# Patient Record
Sex: MALE | Race: WHITE | NOT HISPANIC OR LATINO | Employment: UNEMPLOYED | URBAN - METROPOLITAN AREA
[De-identification: names, ages, dates, MRNs, and addresses within clinical notes are randomized per-mention and may not be internally consistent; named-entity substitution may affect disease eponyms.]

---

## 2018-06-20 ENCOUNTER — OFFICE VISIT CONVERTED (OUTPATIENT)
Dept: FAMILY MEDICINE CLINIC | Facility: CLINIC | Age: 38
End: 2018-06-20
Attending: NURSE PRACTITIONER

## 2018-06-20 ENCOUNTER — CONVERSION ENCOUNTER (OUTPATIENT)
Dept: FAMILY MEDICINE CLINIC | Facility: CLINIC | Age: 38
End: 2018-06-20

## 2019-12-18 ENCOUNTER — HOSPITAL ENCOUNTER (OUTPATIENT)
Dept: FAMILY MEDICINE CLINIC | Facility: CLINIC | Age: 39
Discharge: HOME | End: 2019-12-18
Attending: NURSE PRACTITIONER

## 2019-12-18 ENCOUNTER — CONVERSION ENCOUNTER (OUTPATIENT)
Dept: FAMILY MEDICINE CLINIC | Facility: CLINIC | Age: 39
End: 2019-12-18

## 2019-12-18 ENCOUNTER — OFFICE VISIT CONVERTED (OUTPATIENT)
Dept: FAMILY MEDICINE CLINIC | Facility: CLINIC | Age: 39
End: 2019-12-18
Attending: NURSE PRACTITIONER

## 2019-12-18 LAB
25(OH)D3 SERPL-MCNC: 35.1 NG/ML (ref 30–100)
ALBUMIN SERPL-MCNC: 4.3 G/DL (ref 3.5–5)
ALBUMIN/GLOB SERPL: 1.7 {RATIO} (ref 1.4–2.6)
ALP SERPL-CCNC: 87 U/L (ref 53–128)
ALT SERPL-CCNC: 73 U/L (ref 10–40)
ANION GAP SERPL CALC-SCNC: 14 MMOL/L (ref 8–19)
AST SERPL-CCNC: 48 U/L (ref 15–50)
BASOPHILS # BLD AUTO: 0.06 10*3/UL (ref 0–0.2)
BASOPHILS NFR BLD AUTO: 1.1 % (ref 0–3)
BILIRUB SERPL-MCNC: 0.56 MG/DL (ref 0.2–1.3)
BUN SERPL-MCNC: 13 MG/DL (ref 5–25)
BUN/CREAT SERPL: 12 {RATIO} (ref 6–20)
CALCIUM SERPL-MCNC: 9.1 MG/DL (ref 8.7–10.4)
CHLORIDE SERPL-SCNC: 101 MMOL/L (ref 99–111)
CHOLEST SERPL-MCNC: 179 MG/DL (ref 107–200)
CHOLEST/HDLC SERPL: 5.3 {RATIO} (ref 3–6)
CONV ABS IMM GRAN: 0.01 10*3/UL (ref 0–0.2)
CONV CO2: 26 MMOL/L (ref 22–32)
CONV IMMATURE GRAN: 0.2 % (ref 0–1.8)
CONV TOTAL PROTEIN: 6.8 G/DL (ref 6.3–8.2)
CREAT UR-MCNC: 1.13 MG/DL (ref 0.7–1.2)
DEPRECATED RDW RBC AUTO: 42.8 FL (ref 35.1–43.9)
EOSINOPHIL # BLD AUTO: 0.15 10*3/UL (ref 0–0.7)
EOSINOPHIL # BLD AUTO: 2.8 % (ref 0–7)
ERYTHROCYTE [DISTWIDTH] IN BLOOD BY AUTOMATED COUNT: 12.4 % (ref 11.6–14.4)
GFR SERPLBLD BASED ON 1.73 SQ M-ARVRAT: >60 ML/MIN/{1.73_M2}
GLOBULIN UR ELPH-MCNC: 2.5 G/DL (ref 2–3.5)
GLUCOSE SERPL-MCNC: 117 MG/DL (ref 70–99)
HCT VFR BLD AUTO: 48.5 % (ref 42–52)
HDLC SERPL-MCNC: 34 MG/DL (ref 40–60)
HGB BLD-MCNC: 15.3 G/DL (ref 14–18)
LDLC SERPL CALC-MCNC: 116 MG/DL (ref 70–100)
LYMPHOCYTES # BLD AUTO: 1.95 10*3/UL (ref 1–5)
LYMPHOCYTES NFR BLD AUTO: 36.1 % (ref 20–45)
MCH RBC QN AUTO: 29.6 PG (ref 27–31)
MCHC RBC AUTO-ENTMCNC: 31.5 G/DL (ref 33–37)
MCV RBC AUTO: 93.8 FL (ref 80–96)
MONOCYTES # BLD AUTO: 0.36 10*3/UL (ref 0.2–1.2)
MONOCYTES NFR BLD AUTO: 6.7 % (ref 3–10)
NEUTROPHILS # BLD AUTO: 2.87 10*3/UL (ref 2–8)
NEUTROPHILS NFR BLD AUTO: 53.1 % (ref 30–85)
NRBC CBCN: 0 % (ref 0–0.7)
OSMOLALITY SERPL CALC.SUM OF ELEC: 283 MOSM/KG (ref 273–304)
PLATELET # BLD AUTO: 211 10*3/UL (ref 130–400)
PMV BLD AUTO: 10.9 FL (ref 9.4–12.4)
POTASSIUM SERPL-SCNC: 4.7 MMOL/L (ref 3.5–5.3)
RBC # BLD AUTO: 5.17 10*6/UL (ref 4.7–6.1)
SODIUM SERPL-SCNC: 136 MMOL/L (ref 135–147)
TESTOST SERPL-MCNC: 296 NG/DL (ref 249–836)
TRIGL SERPL-MCNC: 143 MG/DL (ref 40–150)
TSH SERPL-ACNC: 2.19 M[IU]/L (ref 0.27–4.2)
VIT B12 SERPL-MCNC: 495 PG/ML (ref 211–911)
VLDLC SERPL-MCNC: 29 MG/DL (ref 5–37)
WBC # BLD AUTO: 5.4 10*3/UL (ref 4.8–10.8)

## 2020-11-30 ENCOUNTER — HOSPITAL ENCOUNTER (OUTPATIENT)
Dept: FAMILY MEDICINE CLINIC | Facility: CLINIC | Age: 40
Discharge: HOME | End: 2020-11-30
Attending: NURSE PRACTITIONER

## 2020-11-30 ENCOUNTER — CONVERSION ENCOUNTER (OUTPATIENT)
Dept: FAMILY MEDICINE CLINIC | Facility: CLINIC | Age: 40
End: 2020-11-30

## 2020-11-30 ENCOUNTER — OFFICE VISIT CONVERTED (OUTPATIENT)
Dept: FAMILY MEDICINE CLINIC | Facility: CLINIC | Age: 40
End: 2020-11-30
Attending: NURSE PRACTITIONER

## 2020-11-30 LAB
ALBUMIN SERPL-MCNC: 4.5 G/DL (ref 3.5–5)
ALBUMIN/GLOB SERPL: 1.6 {RATIO} (ref 1.4–2.6)
ALP SERPL-CCNC: 105 U/L (ref 53–128)
ALT SERPL-CCNC: 112 U/L (ref 10–40)
ANION GAP SERPL CALC-SCNC: 19 MMOL/L (ref 8–19)
AST SERPL-CCNC: 77 U/L (ref 15–50)
BASOPHILS # BLD AUTO: 0.07 10*3/UL (ref 0–0.2)
BASOPHILS NFR BLD AUTO: 0.9 % (ref 0–3)
BILIRUB SERPL-MCNC: 0.42 MG/DL (ref 0.2–1.3)
BUN SERPL-MCNC: 10 MG/DL (ref 5–25)
BUN/CREAT SERPL: 8 {RATIO} (ref 6–20)
CALCIUM SERPL-MCNC: 9.4 MG/DL (ref 8.7–10.4)
CHLORIDE SERPL-SCNC: 101 MMOL/L (ref 99–111)
CHOLEST SERPL-MCNC: 216 MG/DL (ref 107–200)
CHOLEST/HDLC SERPL: 4.8 {RATIO} (ref 3–6)
CONV ABS IMM GRAN: 0.03 10*3/UL (ref 0–0.2)
CONV CO2: 24 MMOL/L (ref 22–32)
CONV IMMATURE GRAN: 0.4 % (ref 0–1.8)
CONV TOTAL PROTEIN: 7.4 G/DL (ref 6.3–8.2)
CREAT UR-MCNC: 1.21 MG/DL (ref 0.7–1.2)
DEPRECATED RDW RBC AUTO: 42.6 FL (ref 35.1–43.9)
EOSINOPHIL # BLD AUTO: 0.17 10*3/UL (ref 0–0.7)
EOSINOPHIL # BLD AUTO: 2.1 % (ref 0–7)
ERYTHROCYTE [DISTWIDTH] IN BLOOD BY AUTOMATED COUNT: 12.8 % (ref 11.6–14.4)
GFR SERPLBLD BASED ON 1.73 SQ M-ARVRAT: >60 ML/MIN/{1.73_M2}
GLOBULIN UR ELPH-MCNC: 2.9 G/DL (ref 2–3.5)
GLUCOSE SERPL-MCNC: 97 MG/DL (ref 70–99)
HCT VFR BLD AUTO: 50.6 % (ref 42–52)
HDLC SERPL-MCNC: 45 MG/DL (ref 40–60)
HGB BLD-MCNC: 16.7 G/DL (ref 14–18)
LDLC SERPL CALC-MCNC: 141 MG/DL (ref 70–100)
LYMPHOCYTES # BLD AUTO: 2.44 10*3/UL (ref 1–5)
LYMPHOCYTES NFR BLD AUTO: 29.9 % (ref 20–45)
MCH RBC QN AUTO: 29.9 PG (ref 27–31)
MCHC RBC AUTO-ENTMCNC: 33 G/DL (ref 33–37)
MCV RBC AUTO: 90.7 FL (ref 80–96)
MONOCYTES # BLD AUTO: 0.45 10*3/UL (ref 0.2–1.2)
MONOCYTES NFR BLD AUTO: 5.5 % (ref 3–10)
NEUTROPHILS # BLD AUTO: 5 10*3/UL (ref 2–8)
NEUTROPHILS NFR BLD AUTO: 61.2 % (ref 30–85)
NRBC CBCN: 0 % (ref 0–0.7)
OSMOLALITY SERPL CALC.SUM OF ELEC: 287 MOSM/KG (ref 273–304)
PLATELET # BLD AUTO: 223 10*3/UL (ref 130–400)
PMV BLD AUTO: 11 FL (ref 9.4–12.4)
POTASSIUM SERPL-SCNC: 5 MMOL/L (ref 3.5–5.3)
PSA SERPL-MCNC: 0.62 NG/ML (ref 0–4)
RBC # BLD AUTO: 5.58 10*6/UL (ref 4.7–6.1)
SODIUM SERPL-SCNC: 139 MMOL/L (ref 135–147)
TRIGL SERPL-MCNC: 150 MG/DL (ref 40–150)
TSH SERPL-ACNC: 3.12 M[IU]/L (ref 0.27–4.2)
VLDLC SERPL-MCNC: 30 MG/DL (ref 5–37)
WBC # BLD AUTO: 8.16 10*3/UL (ref 4.8–10.8)

## 2020-12-01 LAB — 25(OH)D3 SERPL-MCNC: 39.7 NG/ML (ref 30–100)

## 2021-05-13 NOTE — PROGRESS NOTES
Progress Note      Patient Name: Sean Gore   Patient ID: [id number]   Sex: Male   YOB: 1980    Primary Care Provider: Rosanna MALIK    Visit Date: November 30, 2020    Provider: HELENA Giles   Location: [facility]   Location Address: [address] [place] [address]  [id number]   Location Phone: [phone]          Chief Complaint  · 6 Month Follow up for Hypothyroidism, Hypogonadism, and Vitamin D Deficiency      History Of Present Illness  Sean Gore is a 40 year old /White male who presents for evaluation and treatment of:      6 Month Follow up for Hypothyroidism, Hypogonadism, and Vitamin D Deficiency  Last lab work done 12/2019  Med refills needed    Sleep apnea - pt reports using cpap nightly. Pt is getting approx. 6-8 hours of sleep per night.     Hypothyroidism - pt reports compliance with med. denies any symptoms.         flu shot- declined         Past Medical History  Disease Name Date Onset Notes   Fatty liver --  --    Hypogonadism --  --    Hypothyroidism --  --    Sleep apnea 06/20/2018 --    Vitamin D Deficiency --  --          Past Surgical History  Procedure Name Date Notes   Cyst Removal --  --    Plantar Fasciitis --  --          Medication List  Name Date Started Instructions   Allegra Allergy 180 mg oral tablet 11/30/2020 take 1 tablet (180 mg) by oral route once daily for 30 days   aspirin 81 mg oral tablet,chewable  chew 1 tablet (81 mg) by oral route once daily   cpap and supplies 12/18/2019 use nightly dx G47.3   levothyroxine 50 mcg oral tablet 12/01/2020 take 1 tablet (50 mcg) by oral route once daily on an empty stomach 30 minutes before breakfast for 90 days   montelukast 10 mg oral tablet 11/30/2020 TAKE 1 TABLET BY MOUTH ONCE DAILY IN THE EVENING         Allergy List  Allergen Name Date Reaction Notes   NO KNOWN DRUG ALLERGIES --  --  --          Family Medical History  Disease Name Relative/Age  "Notes   Breast Neoplasm, Malignant Grandmother (paternal)/   --    Diabetes Grandmother (maternal)/   --          Social History  Finding Status Start/Stop Quantity Notes   Alcohol Never --/-- --  --    Tobacco Never --/-- --  --          Review of Systems  · Constitutional  o Admits  o : weight loss  o Denies  o : fatigue, fever, weight gain, chills  · Cardiovascular  o Denies  o : chest Pain, palpitations, edema (swelling)  · Respiratory  o Denies  o : frequent cough, shortness of breath  · Gastrointestinal  o Denies  o : nausea, vomiting, changes in bowel habits  · Genitourinary  o Denies  o : dysuria, urinary frequency, urinary urgency, polyuria  · Neurologic  o Denies  o : headache, tingling or numbness, dizziness  · Musculoskeletal  o Denies  o : joint pain, myalgias  · Psychiatric  o Denies  o : mood changes, memory changes, SI/HI  · Allergic-Immunologic  o Admits  o : seasonal allergies  o Denies  o : eczema, urticaria      Vitals  Date Time BP Position Site L\R Cuff Size HR RR TEMP (F) WT  HT  BMI kg/m2 BSA m2 O2 Sat FR L/min FiO2 HC       06/20/2018 10:03 /86 Sitting    56 - R 20 97.7 341lbs 0oz 6'  3\" 42.62 2.86 99 %      12/18/2019 10:05 /80 Sitting    89 - R 18  352lbs 16oz 6'  3\" 44.12 2.91 99 %      11/30/2020 04:06 /90 Sitting    66 - R 24 98.8  6'  3\"   99 %            Physical Examination  · Constitutional  o Appearance  o : well-nourished, in no acute distress  · Eyes  o Conjunctivae  o : conjunctivae normal  o Sclerae  o : sclerae white  o Pupils and Irises  o : pupils equal and round  o Eyelids/Ocular Adnexae  o : eyelid appearance normal, no exudates present  · Neck  o Inspection/Palpation  o : normal appearance, no masses or tenderness, trachea midline  o Range of Motion  o : cervical range of motion within normal limits  o Thyroid  o : gland size normal, nontender, no nodules or masses present on palpation  · Respiratory  o Respiratory Effort  o : breathing " unlabored  o Inspection of Chest  o : normal appearance  o Auscultation of Lungs  o : normal breath sounds throughout inspiration and expiration  · Cardiovascular  o Heart  o :   § Auscultation of Heart  § : regular rate and rhythm, no murmurs, gallops or rubs  o Peripheral Vascular System  o :   § Carotid Arteries  § : normal pulses bilaterally, no bruits present  § Extremities  § : no clubbing or edema  · Gastrointestinal  o Abdominal Examination  o : abdomen nontender to palpation, tone normal without rigidity or guarding, no masses present, bowel sounds present  · Skin and Subcutaneous Tissue  o General Inspection  o : no rashes or lesions present, no areas of discoloration  o Body Hair  o : hair normal for age, general body hair distribution normal for age  o Digits and Nails  o : no clubbing, cyanosis, deformities or edema present, normal appearing nails  · Neurologic  o Mental Status Examination  o :   § Orientation  § : grossly oriented to person, place and time  o Gait and Station  o : normal gait, able to stand without difficulty  · Psychiatric  o Judgement and Insight  o : judgment and insight intact  o Mood and Affect  o : mood normal, affect appropriate          Assessment  · Need for influenza vaccination     V04.81/Z23  · Hypogonadism, male     257.2/E29.1  · Hypothyroidism     244.9/E03.9  · Vitamin D deficiency     268.9/E55.9      Plan  · Orders  o Vitamin D Level (00980) - 268.9/E55.9 - 11/30/2020  o Male Physical Primary Care Panel (CMP, CBC, TSH, Lipid, PSA) [facility] (64300, 03799, 10530, 45735, 23627, ) - 257.2/E29.1, 244.9/E03.9, 268.9/E55.9 - 11/30/2020  o ACO-39: Current medications updated and reviewed (, 1159F) - - 11/30/2020  o ACO-14: Influenza immunization was not administered for reasons documented [facility] () - - 11/30/2020  · Medications  o montelukast 10 mg oral tablet   SIG: TAKE 1 TABLET BY MOUTH ONCE DAILY IN THE EVENING   DISP: (90) Tablet with 1 refills  Adjusted on  11/30/2020     o Allegra Allergy 180 mg oral tablet   SIG: take 1 tablet (180 mg) by oral route once daily for 30 days   DISP: (30) Tablet with 5 refills  Refilled on 11/30/2020     o Medications have been Reconciled  o Transition of Care or Provider Policy  · Instructions  o Flu vaccine declined.  o Patient was educated/instructed on their diagnosis, treatment and medications prior to discharge from the clinic today.  o Call the office with any concerns or questions.  · Disposition  o Return to Office in 1 year.            Electronically Signed by: HELENA Giles -Author on December 1, 2020 09:45:31 PM

## 2021-05-14 VITALS
TEMPERATURE: 98.8 F | HEIGHT: 75 IN | DIASTOLIC BLOOD PRESSURE: 90 MMHG | RESPIRATION RATE: 24 BRPM | SYSTOLIC BLOOD PRESSURE: 136 MMHG | OXYGEN SATURATION: 99 % | HEART RATE: 66 BPM

## 2021-05-15 VITALS
SYSTOLIC BLOOD PRESSURE: 131 MMHG | HEART RATE: 89 BPM | BODY MASS INDEX: 39.17 KG/M2 | DIASTOLIC BLOOD PRESSURE: 80 MMHG | WEIGHT: 315 LBS | HEIGHT: 75 IN | RESPIRATION RATE: 18 BRPM | OXYGEN SATURATION: 99 %

## 2021-05-16 VITALS
SYSTOLIC BLOOD PRESSURE: 134 MMHG | HEIGHT: 75 IN | OXYGEN SATURATION: 99 % | BODY MASS INDEX: 39.17 KG/M2 | RESPIRATION RATE: 20 BRPM | TEMPERATURE: 97.7 F | DIASTOLIC BLOOD PRESSURE: 86 MMHG | WEIGHT: 315 LBS | HEART RATE: 56 BPM

## 2021-11-29 RX ORDER — LEVOTHYROXINE SODIUM 0.05 MG/1
TABLET ORAL
Qty: 90 TABLET | Refills: 0 | Status: SHIPPED | OUTPATIENT
Start: 2021-11-29 | End: 2022-03-04 | Stop reason: SDUPTHER

## 2022-02-28 RX ORDER — MONTELUKAST SODIUM 10 MG/1
TABLET ORAL
Qty: 90 TABLET | Refills: 0 | OUTPATIENT
Start: 2022-02-28

## 2022-02-28 RX ORDER — LEVOTHYROXINE SODIUM 0.05 MG/1
TABLET ORAL
Qty: 90 TABLET | Refills: 0 | OUTPATIENT
Start: 2022-02-28

## 2022-03-03 RX ORDER — LEVOTHYROXINE SODIUM 0.05 MG/1
TABLET ORAL
Qty: 90 TABLET | Refills: 0 | OUTPATIENT
Start: 2022-03-03

## 2022-03-03 RX ORDER — MONTELUKAST SODIUM 10 MG/1
TABLET ORAL
Qty: 90 TABLET | Refills: 0 | OUTPATIENT
Start: 2022-03-03

## 2022-03-04 RX ORDER — LEVOTHYROXINE SODIUM 0.05 MG/1
50 TABLET ORAL
Qty: 30 TABLET | Refills: 0 | Status: SHIPPED | OUTPATIENT
Start: 2022-03-04 | End: 2022-03-09 | Stop reason: SDUPTHER

## 2022-03-04 RX ORDER — LEVOTHYROXINE SODIUM 0.05 MG/1
TABLET ORAL
Qty: 90 TABLET | Refills: 0 | OUTPATIENT
Start: 2022-03-04

## 2022-03-09 ENCOUNTER — OFFICE VISIT (OUTPATIENT)
Dept: FAMILY MEDICINE CLINIC | Facility: CLINIC | Age: 42
End: 2022-03-09

## 2022-03-09 VITALS
BODY MASS INDEX: 39.17 KG/M2 | DIASTOLIC BLOOD PRESSURE: 86 MMHG | SYSTOLIC BLOOD PRESSURE: 138 MMHG | TEMPERATURE: 98.8 F | HEART RATE: 85 BPM | OXYGEN SATURATION: 97 % | HEIGHT: 75 IN | WEIGHT: 315 LBS

## 2022-03-09 DIAGNOSIS — E55.9 VITAMIN D DEFICIENCY: ICD-10-CM

## 2022-03-09 DIAGNOSIS — Z13.6 ENCOUNTER FOR SCREENING FOR CARDIOVASCULAR DISORDERS: ICD-10-CM

## 2022-03-09 DIAGNOSIS — E03.9 HYPOTHYROIDISM, UNSPECIFIED TYPE: Primary | ICD-10-CM

## 2022-03-09 DIAGNOSIS — R73.01 IMPAIRED FASTING GLUCOSE: ICD-10-CM

## 2022-03-09 DIAGNOSIS — K76.0 FATTY LIVER: ICD-10-CM

## 2022-03-09 PROBLEM — G47.30 SLEEP APNEA: Status: ACTIVE | Noted: 2018-06-20

## 2022-03-09 LAB
25(OH)D3 SERPL-MCNC: 37.2 NG/ML
ALBUMIN SERPL-MCNC: 4.4 G/DL (ref 3.5–5.2)
ALBUMIN/GLOB SERPL: 1.6 G/DL
ALP SERPL-CCNC: 121 U/L (ref 39–117)
ALT SERPL W P-5'-P-CCNC: 109 U/L (ref 1–41)
ANION GAP SERPL CALCULATED.3IONS-SCNC: 13.2 MMOL/L (ref 5–15)
AST SERPL-CCNC: 92 U/L (ref 1–40)
BILIRUB SERPL-MCNC: 0.6 MG/DL (ref 0–1.2)
BUN SERPL-MCNC: 9 MG/DL (ref 6–20)
BUN/CREAT SERPL: 7.9 (ref 7–25)
CALCIUM SPEC-SCNC: 9.1 MG/DL (ref 8.6–10.5)
CHLORIDE SERPL-SCNC: 99 MMOL/L (ref 98–107)
CHOLEST SERPL-MCNC: 186 MG/DL (ref 0–200)
CO2 SERPL-SCNC: 24.8 MMOL/L (ref 22–29)
CREAT SERPL-MCNC: 1.14 MG/DL (ref 0.76–1.27)
DEPRECATED RDW RBC AUTO: 40.9 FL (ref 37–54)
EGFRCR SERPLBLD CKD-EPI 2021: 82.3 ML/MIN/1.73
ERYTHROCYTE [DISTWIDTH] IN BLOOD BY AUTOMATED COUNT: 12.6 % (ref 12.3–15.4)
GLOBULIN UR ELPH-MCNC: 2.7 GM/DL
GLUCOSE SERPL-MCNC: 332 MG/DL (ref 65–99)
HCT VFR BLD AUTO: 47 % (ref 37.5–51)
HDLC SERPL-MCNC: 31 MG/DL (ref 40–60)
HGB BLD-MCNC: 15.5 G/DL (ref 13–17.7)
LDLC SERPL CALC-MCNC: 110 MG/DL (ref 0–100)
LDLC/HDLC SERPL: 3.33 {RATIO}
MCH RBC QN AUTO: 29.7 PG (ref 26.6–33)
MCHC RBC AUTO-ENTMCNC: 33 G/DL (ref 31.5–35.7)
MCV RBC AUTO: 90 FL (ref 79–97)
PLATELET # BLD AUTO: 191 10*3/MM3 (ref 140–450)
PMV BLD AUTO: 11.3 FL (ref 6–12)
POTASSIUM SERPL-SCNC: 4 MMOL/L (ref 3.5–5.2)
PROT SERPL-MCNC: 7.1 G/DL (ref 6–8.5)
RBC # BLD AUTO: 5.22 10*6/MM3 (ref 4.14–5.8)
SODIUM SERPL-SCNC: 137 MMOL/L (ref 136–145)
TRIGL SERPL-MCNC: 259 MG/DL (ref 0–150)
TSH SERPL DL<=0.05 MIU/L-ACNC: 1.89 UIU/ML (ref 0.27–4.2)
VLDLC SERPL-MCNC: 45 MG/DL (ref 5–40)
WBC NRBC COR # BLD: 6.04 10*3/MM3 (ref 3.4–10.8)

## 2022-03-09 PROCEDURE — 80061 LIPID PANEL: CPT | Performed by: NURSE PRACTITIONER

## 2022-03-09 PROCEDURE — 99213 OFFICE O/P EST LOW 20 MIN: CPT | Performed by: NURSE PRACTITIONER

## 2022-03-09 PROCEDURE — 80050 GENERAL HEALTH PANEL: CPT | Performed by: NURSE PRACTITIONER

## 2022-03-09 PROCEDURE — 83036 HEMOGLOBIN GLYCOSYLATED A1C: CPT | Performed by: NURSE PRACTITIONER

## 2022-03-09 PROCEDURE — 82306 VITAMIN D 25 HYDROXY: CPT | Performed by: NURSE PRACTITIONER

## 2022-03-09 RX ORDER — LEVOTHYROXINE SODIUM 0.05 MG/1
TABLET ORAL
COMMUNITY
Start: 2021-05-27 | End: 2022-03-09 | Stop reason: SDUPTHER

## 2022-03-09 RX ORDER — AMLODIPINE BESYLATE AND ATORVASTATIN CALCIUM 5; 10 MG/1; MG/1
TABLET, FILM COATED ORAL
COMMUNITY
Start: 2022-02-01 | End: 2022-03-09

## 2022-03-09 RX ORDER — LEVOTHYROXINE SODIUM 0.05 MG/1
50 TABLET ORAL
Qty: 90 TABLET | Refills: 1 | Status: SHIPPED | OUTPATIENT
Start: 2022-03-09 | End: 2022-06-24 | Stop reason: SDUPTHER

## 2022-03-09 RX ORDER — MONTELUKAST SODIUM 10 MG/1
TABLET ORAL
COMMUNITY
Start: 2021-01-01 | End: 2022-06-24 | Stop reason: SDUPTHER

## 2022-03-09 RX ORDER — LEVOTHYROXINE SODIUM 0.05 MG/1
50 TABLET ORAL
Qty: 30 TABLET | Refills: 5 | Status: SHIPPED | OUTPATIENT
Start: 2022-03-09 | End: 2022-03-09 | Stop reason: SDUPTHER

## 2022-03-09 RX ORDER — BUDESONIDE AND FORMOTEROL FUMARATE DIHYDRATE 160; 4.5 UG/1; UG/1
AEROSOL RESPIRATORY (INHALATION)
COMMUNITY
Start: 2022-01-06 | End: 2022-06-24

## 2022-03-09 RX ORDER — CETIRIZINE HYDROCHLORIDE 10 MG/1
TABLET ORAL
COMMUNITY
Start: 2021-01-01

## 2022-03-09 RX ORDER — ASPIRIN 81 MG/1
TABLET, CHEWABLE ORAL
COMMUNITY

## 2022-03-09 RX ORDER — DICLOFENAC POTASSIUM 50 MG/1
TABLET, FILM COATED ORAL
COMMUNITY
End: 2022-12-09

## 2022-03-09 RX ORDER — AMLODIPINE BESYLATE 5 MG/1
5 TABLET ORAL DAILY
COMMUNITY
Start: 2022-01-28 | End: 2022-06-24 | Stop reason: DRUGHIGH

## 2022-03-09 NOTE — PROGRESS NOTES
Chief Complaint  Med Refill (levothyroxine)    Subjective          Sean Gore is a 41 y.o. male who presents to [facility]    History of Present Illness    Hypothyroid - pt has been out of synthroid for few days.     Denies any other symptoms.     Answers for HPI/ROS submitted by the patient on 3/7/2022  What is the primary reason for your visit?: Other  Please describe your symptoms.: None. Just a follow up.  Have you had these symptoms before?: No  How long have you been having these symptoms?: 1-4 days      PHQ-2 Total Score:   0          Review of Systems   Constitutional: Negative for chills, fatigue and fever.   Respiratory: Negative for cough and shortness of breath.    Cardiovascular: Negative for chest pain and palpitations.   Gastrointestinal: Negative for constipation, diarrhea, nausea and vomiting.   Musculoskeletal: Negative for back pain and neck pain.   Skin: Negative for rash.   Neurological: Negative for dizziness and headaches.          Medical History: has a past medical history of Fatty liver, Hypogonadism in male, Hypothyroidism, Plantar fasciitis, Sleep apnea (06/10/2018), and Vitamin D deficiency.     Surgical History: has a past surgical history that includes Cyst Removal.     Family History: family history includes Breast cancer in his paternal grandmother; Diabetes in his maternal grandmother.     Social History: reports that he has never smoked. He has never used smokeless tobacco. He reports that he does not drink alcohol.    Allergies: Patient has no known allergies.      Health Maintenance Due   Topic Date Due   • ANNUAL PHYSICAL  Never done   • TDAP/TD VACCINES (2 - Tdap) 01/19/2015   • HEPATITIS C SCREENING  Never done            Current Outpatient Medications:   •  amLODIPine (NORVASC) 5 MG tablet, Take 5 mg by mouth Daily., Disp: , Rfl:   •  aspirin 81 MG chewable tablet, aspirin 81 mg oral tablet,chewable chew 1 tablet (81 mg) by oral route  "once daily   Active, Disp: , Rfl:   •  budesonide-formoterol (SYMBICORT) 160-4.5 MCG/ACT inhaler, INHALE 2 PUFFS BY MOUTH EVERY 12 HOURS USE WITH SPACER AND RINSE MOUTH AFTER EACH USE, Disp: , Rfl:   •  cetirizine (zyrTEC) 10 MG tablet, , Disp: , Rfl:   •  diclofenac (CATAFLAM) 50 MG tablet, diclofenac potassium 50 mg oral tablet take 1 tablet (50 mg) by oral route 2 times per day   Suspended, Disp: , Rfl:   •  levothyroxine (SYNTHROID, LEVOTHROID) 50 MCG tablet, Take 1 tablet by mouth Every Morning., Disp: 90 tablet, Rfl: 1  •  montelukast (SINGULAIR) 10 MG tablet, , Disp: , Rfl:   •  amLODIPine-atorvastatin (CADUET) 5-10 MG per tablet, , Disp: , Rfl:       Immunization History   Administered Date(s) Administered   • Td 01/19/2005         Objective       Vitals:    03/09/22 1030   BP: 138/86   Pulse: 85   Temp: 98.8 °F (37.1 °C)   SpO2: 97%   Weight: (!) 162 kg (357 lb)   Height: 190.5 cm (75\")      Body mass index is 44.62 kg/m².   Wt Readings from Last 3 Encounters:   03/09/22 (!) 162 kg (357 lb)   12/18/19 (!) 160 kg (352 lb 15.9 oz)   06/20/18 (!) 155 kg (340 lb 15.9 oz)      BP Readings from Last 3 Encounters:   03/09/22 138/86   11/30/20 136/90   12/18/19 131/80        Physical Exam  Vitals reviewed.   Constitutional:       Appearance: Normal appearance. He is well-developed.   HENT:      Head: Normocephalic and atraumatic.   Eyes:      Conjunctiva/sclera: Conjunctivae normal.      Pupils: Pupils are equal, round, and reactive to light.   Cardiovascular:      Rate and Rhythm: Normal rate and regular rhythm.      Heart sounds: Normal heart sounds. No murmur heard.  Pulmonary:      Effort: Pulmonary effort is normal.      Breath sounds: Normal breath sounds. No wheezing or rhonchi.   Abdominal:      General: Bowel sounds are normal. There is no distension.      Palpations: Abdomen is soft.      Tenderness: There is no abdominal tenderness.   Skin:     General: Skin is warm and dry.   Neurological:      Mental " Status: He is alert and oriented to person, place, and time.   Psychiatric:         Mood and Affect: Mood and affect normal.         Behavior: Behavior normal.         Thought Content: Thought content normal.         Judgment: Judgment normal.             Result Review :                    Assessment and Plan        Diagnoses and all orders for this visit:    1. Hypothyroidism, unspecified type (Primary)  -     Discontinue: levothyroxine (SYNTHROID, LEVOTHROID) 50 MCG tablet; Take 1 tablet by mouth Every Morning.  Dispense: 30 tablet; Refill: 5  -     TSH  -     levothyroxine (SYNTHROID, LEVOTHROID) 50 MCG tablet; Take 1 tablet by mouth Every Morning.  Dispense: 90 tablet; Refill: 1    2. Fatty liver  -     Comprehensive Metabolic Panel  -     CBC (No Diff)    3. Vitamin D deficiency  -     Vitamin D 25 Hydroxy    4. Encounter for screening for cardiovascular disorders  -     Lipid Panel          Follow Up     Return in about 1 year (around 3/9/2023) for Annual physical, Pending results.    Patient was given instructions and counseling regarding his condition or for health maintenance advice. Please see specific information pulled into the AVS if appropriate.     HELENA Giles

## 2022-03-10 LAB — HBA1C MFR BLD: 8.9 % (ref 4.8–5.6)

## 2022-03-11 ENCOUNTER — TELEPHONE (OUTPATIENT)
Dept: FAMILY MEDICINE CLINIC | Facility: CLINIC | Age: 42
End: 2022-03-11

## 2022-03-11 DIAGNOSIS — E78.5 HYPERLIPIDEMIA, UNSPECIFIED HYPERLIPIDEMIA TYPE: ICD-10-CM

## 2022-03-11 DIAGNOSIS — E11.65 TYPE 2 DIABETES MELLITUS WITH HYPERGLYCEMIA, WITHOUT LONG-TERM CURRENT USE OF INSULIN: Primary | ICD-10-CM

## 2022-03-11 RX ORDER — METFORMIN HYDROCHLORIDE 500 MG/1
500 TABLET, EXTENDED RELEASE ORAL 2 TIMES DAILY
Qty: 60 TABLET | Refills: 2 | Status: SHIPPED | OUTPATIENT
Start: 2022-03-11 | End: 2022-06-23

## 2022-03-11 RX ORDER — ATORVASTATIN CALCIUM 10 MG/1
10 TABLET, FILM COATED ORAL NIGHTLY
Qty: 30 TABLET | Refills: 2 | Status: SHIPPED | OUTPATIENT
Start: 2022-03-11 | End: 2022-06-08

## 2022-03-11 RX ORDER — MULTIVIT-MIN/IRON/FOLIC ACID/K 18-600-40
2000 CAPSULE ORAL DAILY
Qty: 90 CAPSULE | Refills: 1 | Status: SHIPPED | OUTPATIENT
Start: 2022-03-11 | End: 2022-12-09

## 2022-03-11 NOTE — TELEPHONE ENCOUNTER
Pt verbalized understanding via phone. Meds sent erx, future lab orders placed. Pt declined diabetes education class.

## 2022-03-13 ENCOUNTER — DOCUMENTATION (OUTPATIENT)
Dept: FAMILY MEDICINE CLINIC | Facility: CLINIC | Age: 42
End: 2022-03-13

## 2022-03-13 PROBLEM — Z79.4 TYPE 2 DIABETES MELLITUS WITH HYPERGLYCEMIA, WITH LONG-TERM CURRENT USE OF INSULIN: Status: ACTIVE | Noted: 2022-03-13

## 2022-03-13 PROBLEM — E11.65 TYPE 2 DIABETES MELLITUS WITH HYPERGLYCEMIA, WITH LONG-TERM CURRENT USE OF INSULIN: Status: ACTIVE | Noted: 2022-03-13

## 2022-03-16 RX ORDER — BLOOD-GLUCOSE METER
1 KIT MISCELLANEOUS DAILY
Qty: 1 EACH | Refills: 0 | Status: SHIPPED | OUTPATIENT
Start: 2022-03-16

## 2022-03-16 RX ORDER — LANCETS 23 GAUGE
1 EACH MISCELLANEOUS DAILY
Qty: 100 EACH | Refills: 1 | Status: SHIPPED | OUTPATIENT
Start: 2022-03-16

## 2022-04-27 ENCOUNTER — PATIENT MESSAGE (OUTPATIENT)
Dept: FAMILY MEDICINE CLINIC | Facility: CLINIC | Age: 42
End: 2022-04-27

## 2022-04-27 RX ORDER — PROCHLORPERAZINE 25 MG/1
SUPPOSITORY RECTAL
Qty: 3 EACH | Refills: 2 | Status: SHIPPED | OUTPATIENT
Start: 2022-04-27 | End: 2023-03-20

## 2022-04-27 RX ORDER — PROCHLORPERAZINE 25 MG/1
1 SUPPOSITORY RECTAL
Qty: 1 EACH | Refills: 3 | Status: SHIPPED | OUTPATIENT
Start: 2022-04-27

## 2022-04-27 RX ORDER — PROCHLORPERAZINE 25 MG/1
1 SUPPOSITORY RECTAL ONCE
Qty: 1 EACH | Refills: 0 | Status: SHIPPED | OUTPATIENT
Start: 2022-04-27 | End: 2022-04-27

## 2022-04-27 NOTE — TELEPHONE ENCOUNTER
From: Sean Gore  To: HELENA Giles  Sent: 4/27/2022 3:16 PM EDT  Subject: Prescription     My wife uses the dexcom to monitor her sugar. Could i get that to monitor mine?

## 2022-06-08 RX ORDER — ATORVASTATIN CALCIUM 10 MG/1
TABLET, FILM COATED ORAL
Qty: 90 TABLET | Refills: 0 | Status: SHIPPED | OUTPATIENT
Start: 2022-06-08 | End: 2022-06-23

## 2022-06-23 RX ORDER — METFORMIN HYDROCHLORIDE 500 MG/1
TABLET, EXTENDED RELEASE ORAL
Qty: 180 TABLET | Refills: 0 | Status: SHIPPED | OUTPATIENT
Start: 2022-06-23 | End: 2022-06-24 | Stop reason: SDUPTHER

## 2022-06-23 RX ORDER — ATORVASTATIN CALCIUM 10 MG/1
TABLET, FILM COATED ORAL
Qty: 90 TABLET | Refills: 0 | Status: SHIPPED | OUTPATIENT
Start: 2022-06-23 | End: 2022-06-24 | Stop reason: SDUPTHER

## 2022-06-24 ENCOUNTER — OFFICE VISIT (OUTPATIENT)
Dept: FAMILY MEDICINE CLINIC | Facility: CLINIC | Age: 42
End: 2022-06-24

## 2022-06-24 VITALS
OXYGEN SATURATION: 99 % | TEMPERATURE: 98.5 F | HEART RATE: 73 BPM | WEIGHT: 315 LBS | BODY MASS INDEX: 39.17 KG/M2 | SYSTOLIC BLOOD PRESSURE: 145 MMHG | HEIGHT: 75 IN | DIASTOLIC BLOOD PRESSURE: 86 MMHG

## 2022-06-24 DIAGNOSIS — E03.9 HYPOTHYROIDISM, UNSPECIFIED TYPE: ICD-10-CM

## 2022-06-24 DIAGNOSIS — I10 PRIMARY HYPERTENSION: ICD-10-CM

## 2022-06-24 DIAGNOSIS — T78.40XD ALLERGY, SUBSEQUENT ENCOUNTER: ICD-10-CM

## 2022-06-24 DIAGNOSIS — Z79.4 TYPE 2 DIABETES MELLITUS WITH HYPERGLYCEMIA, WITH LONG-TERM CURRENT USE OF INSULIN: Primary | ICD-10-CM

## 2022-06-24 DIAGNOSIS — E11.65 TYPE 2 DIABETES MELLITUS WITH HYPERGLYCEMIA, WITH LONG-TERM CURRENT USE OF INSULIN: Primary | ICD-10-CM

## 2022-06-24 DIAGNOSIS — E11.65 TYPE 2 DIABETES MELLITUS WITH HYPERGLYCEMIA, WITHOUT LONG-TERM CURRENT USE OF INSULIN: ICD-10-CM

## 2022-06-24 DIAGNOSIS — E78.5 HYPERLIPIDEMIA, UNSPECIFIED HYPERLIPIDEMIA TYPE: ICD-10-CM

## 2022-06-24 LAB
ALBUMIN SERPL-MCNC: 4.3 G/DL (ref 3.5–5.2)
ALBUMIN UR-MCNC: 1.8 MG/DL
ALBUMIN/GLOB SERPL: 1.9 G/DL
ALP SERPL-CCNC: 87 U/L (ref 39–117)
ALT SERPL W P-5'-P-CCNC: 42 U/L (ref 1–41)
ANION GAP SERPL CALCULATED.3IONS-SCNC: 10.2 MMOL/L (ref 5–15)
AST SERPL-CCNC: 32 U/L (ref 1–40)
BILIRUB SERPL-MCNC: 0.4 MG/DL (ref 0–1.2)
BUN SERPL-MCNC: 12 MG/DL (ref 6–20)
BUN/CREAT SERPL: 11.2 (ref 7–25)
CALCIUM SPEC-SCNC: 9.5 MG/DL (ref 8.6–10.5)
CHLORIDE SERPL-SCNC: 104 MMOL/L (ref 98–107)
CHOLEST SERPL-MCNC: 102 MG/DL (ref 0–200)
CO2 SERPL-SCNC: 23.8 MMOL/L (ref 22–29)
CREAT SERPL-MCNC: 1.07 MG/DL (ref 0.76–1.27)
CREAT UR-MCNC: 116.2 MG/DL
EGFRCR SERPLBLD CKD-EPI 2021: 88.3 ML/MIN/1.73
GLOBULIN UR ELPH-MCNC: 2.3 GM/DL
GLUCOSE SERPL-MCNC: 94 MG/DL (ref 65–99)
HBA1C MFR BLD: 5.7 % (ref 4.8–5.6)
HDLC SERPL-MCNC: 32 MG/DL (ref 40–60)
LDLC SERPL CALC-MCNC: 47 MG/DL (ref 0–100)
LDLC/HDLC SERPL: 1.39 {RATIO}
MICROALBUMIN/CREAT UR: 15.5 MG/G
POTASSIUM SERPL-SCNC: 3.9 MMOL/L (ref 3.5–5.2)
PROT SERPL-MCNC: 6.6 G/DL (ref 6–8.5)
SODIUM SERPL-SCNC: 138 MMOL/L (ref 136–145)
TRIGL SERPL-MCNC: 128 MG/DL (ref 0–150)
VLDLC SERPL-MCNC: 23 MG/DL (ref 5–40)

## 2022-06-24 PROCEDURE — 82570 ASSAY OF URINE CREATININE: CPT | Performed by: NURSE PRACTITIONER

## 2022-06-24 PROCEDURE — 82043 UR ALBUMIN QUANTITATIVE: CPT | Performed by: NURSE PRACTITIONER

## 2022-06-24 PROCEDURE — 80053 COMPREHEN METABOLIC PANEL: CPT | Performed by: NURSE PRACTITIONER

## 2022-06-24 PROCEDURE — 99214 OFFICE O/P EST MOD 30 MIN: CPT | Performed by: NURSE PRACTITIONER

## 2022-06-24 PROCEDURE — 83036 HEMOGLOBIN GLYCOSYLATED A1C: CPT | Performed by: NURSE PRACTITIONER

## 2022-06-24 PROCEDURE — 80061 LIPID PANEL: CPT | Performed by: NURSE PRACTITIONER

## 2022-06-24 RX ORDER — MONTELUKAST SODIUM 10 MG/1
10 TABLET ORAL NIGHTLY
Qty: 90 TABLET | Refills: 1 | Status: SHIPPED | OUTPATIENT
Start: 2022-06-24 | End: 2022-12-22

## 2022-06-24 RX ORDER — ATORVASTATIN CALCIUM 10 MG/1
10 TABLET, FILM COATED ORAL NIGHTLY
Qty: 90 TABLET | Refills: 1 | Status: SHIPPED | OUTPATIENT
Start: 2022-06-24 | End: 2023-03-21

## 2022-06-24 RX ORDER — PROCHLORPERAZINE 25 MG/1
SUPPOSITORY RECTAL
COMMUNITY
Start: 2022-06-09 | End: 2022-08-25

## 2022-06-24 RX ORDER — LEVOTHYROXINE SODIUM 0.05 MG/1
50 TABLET ORAL
Qty: 90 TABLET | Refills: 1 | Status: SHIPPED | OUTPATIENT
Start: 2022-06-24 | End: 2022-12-09 | Stop reason: SDUPTHER

## 2022-06-24 RX ORDER — AMLODIPINE BESYLATE AND BENAZEPRIL HYDROCHLORIDE 5; 10 MG/1; MG/1
1 CAPSULE ORAL DAILY
Qty: 90 CAPSULE | Refills: 1 | Status: SHIPPED | OUTPATIENT
Start: 2022-06-24 | End: 2022-12-09 | Stop reason: SINTOL

## 2022-06-24 RX ORDER — METFORMIN HYDROCHLORIDE 500 MG/1
500 TABLET, EXTENDED RELEASE ORAL 2 TIMES DAILY
Qty: 180 TABLET | Refills: 1 | Status: SHIPPED | OUTPATIENT
Start: 2022-06-24 | End: 2023-03-21

## 2022-06-24 NOTE — PROGRESS NOTES
Chief Complaint  Diabetes (Patient is here for a 3 month follow up visit. States no new changes at this time. ) and Hypothyroidism    Subjective          Sean Gore is a 42 y.o. male who presents to [facility]    History of Present Illness    Dm - pt has not been checking bs. Pt has lost 34 lbs with diet modifications of no cokes and candy.    HTN - pt reports compliance with meds. Pt b/p elevated.     Hypothyroid - pt reports compliance with med. Energy level is good for pt.       PHQ-2 Total Score:     PHQ-9 Total Score:          Review of Systems   Constitutional: Negative for chills, fatigue and fever.   Respiratory: Negative for cough and shortness of breath.    Cardiovascular: Negative for chest pain and palpitations.   Gastrointestinal: Negative for constipation, diarrhea, nausea and vomiting.   Musculoskeletal: Negative for back pain and neck pain.   Skin: Negative for rash.   Neurological: Negative for dizziness and headaches.          Medical History: has a past medical history of Fatty liver, Hypogonadism in male, Hypothyroidism, Plantar fasciitis, Sleep apnea (06/10/2018), and Vitamin D deficiency.     Surgical History: has a past surgical history that includes Cyst Removal.     Family History: family history includes Breast cancer in his paternal grandmother; Diabetes in his maternal grandmother.     Social History: reports that he has never smoked. He has never used smokeless tobacco. He reports that he does not drink alcohol.    Allergies: Patient has no known allergies.      Health Maintenance Due   Topic Date Due   • URINE MICROALBUMIN  Never done   • ANNUAL PHYSICAL  Never done   • COVID-19 Vaccine (1) Never done   • Hepatitis B (1 of 3 - Risk 3-dose series) Never done   • TDAP/TD VACCINES (2 - Tdap) 01/19/2015   • HEPATITIS C SCREENING  Never done   • DIABETIC FOOT EXAM  Never done   • DIABETIC EYE EXAM  Never done            Current Outpatient  Medications:   •  aspirin 81 MG chewable tablet, aspirin 81 mg oral tablet,chewable chew 1 tablet (81 mg) by oral route once daily   Active, Disp: , Rfl:   •  atorvastatin (LIPITOR) 10 MG tablet, Take 1 tablet by mouth Every Night., Disp: 90 tablet, Rfl: 1  •  cetirizine (zyrTEC) 10 MG tablet, , Disp: , Rfl:   •  Cholecalciferol (Vitamin D) 50 MCG (2000 UT) capsule, Take 1 capsule by mouth Daily., Disp: 90 capsule, Rfl: 1  •  Continuous Blood Gluc  (Dexcom G6 ) device, , Disp: , Rfl:   •  Continuous Blood Gluc Sensor (Dexcom G6 Sensor), Every 10 (Ten) Days., Disp: 3 each, Rfl: 2  •  Continuous Blood Gluc Transmit (Dexcom G6 Transmitter) misc, 1 Device Every 3 (Three) Months., Disp: 1 each, Rfl: 3  •  levothyroxine (SYNTHROID, LEVOTHROID) 50 MCG tablet, Take 1 tablet by mouth Every Morning., Disp: 90 tablet, Rfl: 1  •  metFORMIN ER (GLUCOPHAGE-XR) 500 MG 24 hr tablet, Take 1 tablet by mouth 2 (Two) Times a Day., Disp: 180 tablet, Rfl: 1  •  montelukast (SINGULAIR) 10 MG tablet, Take 1 tablet by mouth Every Night., Disp: 90 tablet, Rfl: 1  •  Semaglutide,0.25 or 0.5MG/DOS, (OZEMPIC) 2 MG/1.5ML solution pen-injector, Inject 0.5 mg under the skin into the appropriate area as directed 1 (One) Time Per Week., Disp: 18 mL, Rfl: 1  •  amLODIPine-benazepril (Lotrel) 5-10 MG per capsule, Take 1 capsule by mouth Daily., Disp: 90 capsule, Rfl: 1  •  diclofenac (CATAFLAM) 50 MG tablet, diclofenac potassium 50 mg oral tablet take 1 tablet (50 mg) by oral route 2 times per day   Suspended, Disp: , Rfl:   •  glucose blood test strip, Use as instructed, Disp: 100 each, Rfl: 2  •  glucose monitor monitoring kit, 1 each Daily., Disp: 1 each, Rfl: 0  •  Lancets 28G misc, 1 Units Daily., Disp: 100 each, Rfl: 1      Immunization History   Administered Date(s) Administered   • Td 01/19/2005         Objective       Vitals:    06/24/22 0955   BP: 145/86   BP Location: Left arm   Patient Position: Sitting   Cuff Size: Adult  "  Pulse: 73   Temp: 98.5 °F (36.9 °C)   TempSrc: Oral   SpO2: 99%   Weight: (!) 147 kg (324 lb 6.4 oz)   Height: 190.5 cm (75\")      Body mass index is 40.55 kg/m².   Wt Readings from Last 3 Encounters:   06/24/22 (!) 147 kg (324 lb 6.4 oz)   03/09/22 (!) 162 kg (357 lb)   12/18/19 (!) 160 kg (352 lb 15.9 oz)      BP Readings from Last 3 Encounters:   06/24/22 145/86   03/09/22 138/86   11/30/20 136/90        Physical Exam  Vitals reviewed.   Constitutional:       Appearance: Normal appearance. He is well-developed.   HENT:      Head: Normocephalic and atraumatic.   Eyes:      Conjunctiva/sclera: Conjunctivae normal.      Pupils: Pupils are equal, round, and reactive to light.   Cardiovascular:      Rate and Rhythm: Normal rate and regular rhythm.      Heart sounds: Normal heart sounds. No murmur heard.  Pulmonary:      Effort: Pulmonary effort is normal.      Breath sounds: Normal breath sounds. No wheezing or rhonchi.   Abdominal:      General: Bowel sounds are normal. There is no distension.      Palpations: Abdomen is soft.      Tenderness: There is no abdominal tenderness.   Skin:     General: Skin is warm and dry.   Neurological:      Mental Status: He is alert and oriented to person, place, and time.   Psychiatric:         Mood and Affect: Mood and affect normal.         Behavior: Behavior normal.         Thought Content: Thought content normal.         Judgment: Judgment normal.             Result Review :       Common labs    Common Labsle 3/9/22 3/9/22 3/9/22 3/9/22    1058 1058 1058 1058   Glucose   332 (A)    BUN   9    Creatinine   1.14    Sodium   137    Potassium   4.0    Chloride   99    Calcium   9.1    Albumin   4.40    Total Bilirubin   0.6    Alkaline Phosphatase   121 (A)    AST (SGOT)   92 (A)    ALT (SGPT)   109 (A)    WBC 6.04      Hemoglobin 15.5      Hematocrit 47.0      Platelets 191      Total Cholesterol  186     Triglycerides  259 (A)     HDL Cholesterol  31 (A)     LDL Cholesterol   " 110 (A)     Hemoglobin A1C    8.90 (A)   (A) Abnormal value                         Assessment and Plan        Diagnoses and all orders for this visit:    1. Type 2 diabetes mellitus with hyperglycemia, with long-term current use of insulin (HCC) (Primary)  -     atorvastatin (LIPITOR) 10 MG tablet; Take 1 tablet by mouth Every Night.  Dispense: 90 tablet; Refill: 1  -     metFORMIN ER (GLUCOPHAGE-XR) 500 MG 24 hr tablet; Take 1 tablet by mouth 2 (Two) Times a Day.  Dispense: 180 tablet; Refill: 1  -     Semaglutide,0.25 or 0.5MG/DOS, (OZEMPIC) 2 MG/1.5ML solution pen-injector; Inject 0.5 mg under the skin into the appropriate area as directed 1 (One) Time Per Week.  Dispense: 18 mL; Refill: 1  -     Microalbumin / Creatinine Urine Ratio - Urine, Clean Catch    2. Hypothyroidism, unspecified type  -     levothyroxine (SYNTHROID, LEVOTHROID) 50 MCG tablet; Take 1 tablet by mouth Every Morning.  Dispense: 90 tablet; Refill: 1    3. Primary hypertension  -     amLODIPine-benazepril (Lotrel) 5-10 MG per capsule; Take 1 capsule by mouth Daily.  Dispense: 90 capsule; Refill: 1    4. Allergy, subsequent encounter  -     montelukast (SINGULAIR) 10 MG tablet; Take 1 tablet by mouth Every Night.  Dispense: 90 tablet; Refill: 1          Follow Up     Return in about 6 months (around 12/24/2022) for Next scheduled follow up.    Patient was given instructions and counseling regarding his condition or for health maintenance advice. Please see specific information pulled into the AVS if appropriate.     HELENA Giles

## 2022-06-24 NOTE — PATIENT INSTRUCTIONS
Diabetes Mellitus and Nutrition, Adult  When you have diabetes, or diabetes mellitus, it is very important to have healthy eating habits because your blood sugar (glucose) levels are greatly affected by what you eat and drink. Eating healthy foods in the right amounts, at about the same times every day, can help you:  Control your blood glucose.  Lower your risk of heart disease.  Improve your blood pressure.  Reach or maintain a healthy weight.  What can affect my meal plan?  Every person with diabetes is different, and each person has different needs for a meal plan. Your health care provider may recommend that you work with a dietitian to make a meal plan that is best for you. Your meal plan may vary depending on factors such as:  The calories you need.  The medicines you take.  Your weight.  Your blood glucose, blood pressure, and cholesterol levels.  Your activity level.  Other health conditions you have, such as heart or kidney disease.  How do carbohydrates affect me?  Carbohydrates, also called carbs, affect your blood glucose level more than any other type of food. Eating carbs naturally raises the amount of glucose in your blood. Carb counting is a method for keeping track of how many carbs you eat. Counting carbs is important to keep your blood glucose at a healthy level, especially if you use insulin or take certain oral diabetes medicines.  It is important to know how many carbs you can safely have in each meal. This is different for every person. Your dietitian can help you calculate how many carbs you should have at each meal and for each snack.  How does alcohol affect me?  Alcohol can cause a sudden decrease in blood glucose (hypoglycemia), especially if you use insulin or take certain oral diabetes medicines. Hypoglycemia can be a life-threatening condition. Symptoms of hypoglycemia, such as sleepiness, dizziness, and confusion, are similar to symptoms of having too much alcohol.  Do not drink  "alcohol if:  Your health care provider tells you not to drink.  You are pregnant, may be pregnant, or are planning to become pregnant.  If you drink alcohol:  Do not drink on an empty stomach.  Limit how much you use to:  0-1 drink a day for women.  0-2 drinks a day for men.  Be aware of how much alcohol is in your drink. In the [place] one drink equals one 12 oz bottle of beer (355 mL), one 5 oz glass of wine (148 mL), or one 1½ oz glass of hard liquor (44 mL).  Keep yourself hydrated with water, diet soda, or unsweetened iced tea.  Keep in mind that regular soda, juice, and other mixers may contain a lot of sugar and must be counted as carbs.  What are tips for following this plan?    Reading food labels  Start by checking the serving size on the \"Nutrition Facts\" label of packaged foods and drinks. The amount of calories, carbs, fats, and other nutrients listed on the label is based on one serving of the item. Many items contain more than one serving per package.  Check the total grams (g) of carbs in one serving. You can calculate the number of servings of carbs in one serving by dividing the total carbs by 15. For example, if a food has 30 g of total carbs per serving, it would be equal to 2 servings of carbs.  Check the number of grams (g) of saturated fats and trans fats in one serving. Choose foods that have a low amount or none of these fats.  Check the number of milligrams (mg) of salt (sodium) in one serving. Most people should limit total sodium intake to less than 2,300 mg per day.  Always check the nutrition information of foods labeled as \"low-fat\" or \"nonfat.\" These foods may be higher in added sugar or refined carbs and should be avoided.  Talk to your dietitian to identify your daily goals for nutrients listed on the label.  Shopping  Avoid buying canned, pre-made, or processed foods. These foods tend to be high in fat, sodium, and added sugar.  Shop around the outside edge of the grocery store. This " is where you will most often find fresh fruits and vegetables, bulk grains, fresh meats, and fresh dairy.  Cooking  Use low-heat cooking methods, such as baking, instead of high-heat cooking methods like deep frying.  Cook using healthy oils, such as olive, canola, or sunflower oil.  Avoid cooking with butter, cream, or high-fat meats.  Meal planning  Eat meals and snacks regularly, preferably at the same times every day. Avoid going long periods of time without eating.  Eat foods that are high in fiber, such as fresh fruits, vegetables, beans, and whole grains. Talk with your dietitian about how many servings of carbs you can eat at each meal.  Eat 4-6 oz (112-168 g) of lean protein each day, such as lean meat, chicken, fish, eggs, or tofu. One ounce (oz) of lean protein is equal to:  1 oz (28 g) of meat, chicken, or fish.  1 egg.  ¼ cup (62 g) of tofu.  Eat some foods each day that contain healthy fats, such as avocado, nuts, seeds, and fish.  What foods should I eat?  Fruits  Berries. Apples. Oranges. Peaches. Apricots. Plums. Grapes. [place]. Papaya. Pomegranate. Kiwi. Cherries.  Vegetables  Lettuce. Spinach. Leafy greens, including kale, chard, minerva greens, and mustard greens. Beets. Cauliflower. Cabbage. Broccoli. Carrots. Green beans. Tomatoes. Peppers. Onions. Cucumbers. [place] sprouts.  Grains  Whole grains, such as whole-wheat or whole-grain bread, crackers, tortillas, cereal, and pasta. Unsweetened oatmeal. Quinoa. Brown or wild rice.  Meats and other proteins  Seafood. Poultry without skin. Lean cuts of poultry and beef. Tofu. Nuts. Seeds.  Dairy  Low-fat or fat-free dairy products such as milk, yogurt, and cheese.  The items listed above may not be a complete list of foods and beverages you can eat. Contact a dietitian for more information.  What foods should I avoid?  Fruits  Fruits canned with syrup.  Vegetables  Canned vegetables. Frozen vegetables with butter or cream sauce.  Grains  Refined  white flour and flour products such as bread, pasta, snack foods, and cereals. Avoid all processed foods.  Meats and other proteins  Fatty cuts of meat. Poultry with skin. Breaded or fried meats. Processed meat. Avoid saturated fats.  Dairy  Full-fat yogurt, cheese, or milk.  Beverages  Sweetened drinks, such as soda or iced tea.  The items listed above may not be a complete list of foods and beverages you should avoid. Contact a dietitian for more information.  Questions to ask a health care provider  Do I need to meet with a diabetes educator?  Do I need to meet with a dietitian?  What number can I call if I have questions?  When are the best times to check my blood glucose?  Where to find more information:  American Diabetes Association: diabetes.org  Academy of Nutrition and Dietetics: www.eatright.org  National [place] of Diabetes and Digestive and Kidney Diseases: www.niddk.nih.gov  Association of Diabetes Care and Education Specialists: www.diabeteseducator.org  Summary  It is important to have healthy eating habits because your blood sugar (glucose) levels are greatly affected by what you eat and drink.  A healthy meal plan will help you control your blood glucose and maintain a healthy lifestyle.  Your health care provider may recommend that you work with a dietitian to make a meal plan that is best for you.  Keep in mind that carbohydrates (carbs) and alcohol have immediate effects on your blood glucose levels. It is important to count carbs and to use alcohol carefully.  This information is not intended to replace advice given to you by your health care provider. Make sure you discuss any questions you have with your health care provider.  Document Revised: 11/24/2020 Document Reviewed: 11/24/2020  Elsevier Patient Education © 2021 [employer]

## 2022-08-25 RX ORDER — PROCHLORPERAZINE 25 MG/1
SUPPOSITORY RECTAL
Qty: 1 EACH | Refills: 0 | Status: SHIPPED | OUTPATIENT
Start: 2022-08-25

## 2022-11-15 ENCOUNTER — OFFICE VISIT (OUTPATIENT)
Dept: FAMILY MEDICINE CLINIC | Facility: CLINIC | Age: 42
End: 2022-11-15

## 2022-11-15 VITALS
TEMPERATURE: 97.5 F | SYSTOLIC BLOOD PRESSURE: 148 MMHG | HEIGHT: 75 IN | WEIGHT: 315 LBS | DIASTOLIC BLOOD PRESSURE: 84 MMHG | BODY MASS INDEX: 39.17 KG/M2 | HEART RATE: 61 BPM | OXYGEN SATURATION: 100 %

## 2022-11-15 DIAGNOSIS — Z13.6 ENCOUNTER FOR SCREENING FOR CARDIOVASCULAR DISORDERS: Primary | ICD-10-CM

## 2022-11-15 DIAGNOSIS — G47.62 NOCTURNAL LEG CRAMPS: ICD-10-CM

## 2022-11-15 DIAGNOSIS — R79.89 ELEVATED D-DIMER: ICD-10-CM

## 2022-11-15 LAB
ANION GAP SERPL CALCULATED.3IONS-SCNC: 7.7 MMOL/L (ref 5–15)
BUN SERPL-MCNC: 12 MG/DL (ref 6–20)
BUN/CREAT SERPL: 11 (ref 7–25)
CALCIUM SPEC-SCNC: 9.6 MG/DL (ref 8.6–10.5)
CHLORIDE SERPL-SCNC: 104 MMOL/L (ref 98–107)
CO2 SERPL-SCNC: 27.3 MMOL/L (ref 22–29)
CREAT SERPL-MCNC: 1.09 MG/DL (ref 0.76–1.27)
EGFRCR SERPLBLD CKD-EPI 2021: 86.4 ML/MIN/1.73
GLUCOSE SERPL-MCNC: 130 MG/DL (ref 65–99)
MAGNESIUM SERPL-MCNC: 2.1 MG/DL (ref 1.6–2.6)
POTASSIUM SERPL-SCNC: 4.6 MMOL/L (ref 3.5–5.2)
SODIUM SERPL-SCNC: 139 MMOL/L (ref 136–145)

## 2022-11-15 PROCEDURE — 99213 OFFICE O/P EST LOW 20 MIN: CPT | Performed by: FAMILY MEDICINE

## 2022-11-15 PROCEDURE — 80048 BASIC METABOLIC PNL TOTAL CA: CPT | Performed by: FAMILY MEDICINE

## 2022-11-15 PROCEDURE — 83735 ASSAY OF MAGNESIUM: CPT | Performed by: FAMILY MEDICINE

## 2022-11-15 RX ORDER — LEVOTHYROXINE SODIUM 0.05 MG/1
50 TABLET ORAL
COMMUNITY
Start: 2022-09-22 | End: 2023-03-20

## 2022-11-15 NOTE — PROGRESS NOTES
Chief Complaint   Patient presents with   • Follow-up     ER / possible blood clot         Subjective     Sean Gore  has a past medical history of Fatty liver, Hypogonadism in male, Hypothyroidism, Plantar fasciitis, Sleep apnea (06/10/2018), and Vitamin D deficiency.    ER [id number] was to the emergency room in [place].  He presented there with some chest discomfort.  His initial evaluation was remarkable for an elevated D-dimer.  He separately had a CTA and was negative for pulmonary embolism.  He has not had any more chest pain or shortness of breath since then.  He has had some cramps in his right lower leg but no swelling.  This has begun since his emergency room visit.  He noticed this more at nighttime while he is sleeping and awaken him from sleep.  This not occur with normal activities or ambulating.      PHQ-2 Depression Screening  Little interest or pleasure in doing things?     Feeling down, depressed, or hopeless?     PHQ-2 Total Score     PHQ-9 Depression Screening  Little interest or pleasure in doing things?     Feeling down, depressed, or hopeless?     Trouble falling or staying asleep, or sleeping too much?     Feeling tired or having little energy?     Poor appetite or overeating?     Feeling bad about yourself - or that you are a failure or have let yourself or your family down?     Trouble concentrating on things, such as reading the newspaper or watching television?     Moving or speaking so slowly that other people could have noticed? Or the opposite - being so fidgety or restless that you have been moving around a lot more than usual?     Thoughts that you would be better off dead, or of hurting yourself in some way?     PHQ-9 Total Score     If you checked off any problems, how difficult have these problems made it for you to do your work, take care of things at home, or get along with other people?       No Known Allergies    Prior to Admission medications    Medication Sig  Start Date End Date Taking? Authorizing Provider   amLODIPine-benazepril (Lotrel) 5-10 MG per capsule Take 1 capsule by mouth Daily. 6/24/22  Yes Rosanna Pritchard APRN   aspirin 81 MG chewable tablet aspirin 81 mg oral tablet,chewable chew 1 tablet (81 mg) by oral route once daily   Active   Yes Dayanara Gavin MD   atorvastatin (LIPITOR) 10 MG tablet Take 1 tablet by mouth Every Night. 6/24/22  Yes Rosanna Pritchard APRN   cetirizine (zyrTEC) 10 MG tablet  1/1/21  Yes Dayanara Gavin MD   Cholecalciferol (Vitamin D) 50 MCG (2000 UT) capsule Take 1 capsule by mouth Daily. 3/11/22  Yes Rosanna Pritchard APRN   Continuous Blood Gluc  (Dexcom G6 ) device AS DIRECTED 8/25/22  Yes Rosanna Pritchard APRN   Continuous Blood Gluc Sensor (Dexcom G6 Sensor) Every 10 (Ten) Days. 4/27/22  Yes Rosanna Pritchard APRN   Continuous Blood Gluc Transmit (Dexcom G6 Transmitter) misc 1 Device Every 3 (Three) Months. 4/27/22  Yes Rosanna Pritchard APRN   diclofenac (CATAFLAM) 50 MG tablet diclofenac potassium 50 mg oral tablet take 1 tablet (50 mg) by oral route 2 times per day   Suspended   Yes Dayanara Gavin MD   glucose blood test strip Use as instructed 3/16/22  Yes Rosanna Pritchard APRN   glucose monitor monitoring kit 1 each Daily. 3/16/22  Yes Rosanna Pritchard APRN   Lancets 28G misc 1 Units Daily. 3/16/22  Yes Rosanna Pritchard APRN   levothyroxine (SYNTHROID, LEVOTHROID) 50 MCG tablet Take 1 tablet by mouth Every Morning. 6/24/22  Yes Rosanna Pritchard APRN   levothyroxine (SYNTHROID, LEVOTHROID) 50 MCG tablet Take 1 tablet by mouth. 9/22/22  Yes Dayanara Gavin MD   metFORMIN ER (GLUCOPHAGE-XR) 500 MG 24 hr tablet Take 1 tablet by mouth 2 (Two) Times a Day. 6/24/22  Yes Rosanna Pritchard APRN   montelukast (SINGULAIR) 10 MG tablet Take 1 tablet by mouth Every Night. 6/24/22  Yes Rosanna Pritchard APRN   Semaglutide,0.25 or 0.5MG/DOS, (OZEMPIC) 2 MG/1.5ML solution pen-injector Inject 0.5 mg under  "the skin into the appropriate area as directed 1 (One) Time Per Week. 6/24/22  Yes Rosanna Pritchard APRN        Patient Active Problem List   Diagnosis   • Hypothyroidism   • Fatty liver   • Vitamin D deficiency   • Ovarian failure   • Sleep apnea   • Type 2 diabetes mellitus with hyperglycemia, with long-term current use of insulin (HCC)   • Nocturnal leg cramps        Past Surgical History:   Procedure Laterality Date   • CYST REMOVAL         Social History     Socioeconomic History   • Marital status: Single   Tobacco Use   • Smoking status: Never   • Smokeless tobacco: Never   Vaping Use   • Vaping Use: Never used   Substance and Sexual Activity   • Alcohol use: Never       Family History   Problem Relation Age of Onset   • Diabetes Maternal Grandmother    • Breast cancer Paternal Grandmother        Family history, surgical history, past medical history, Allergies and meds reviewed with patient today and updated in [facility] EMR.     ROS:  Review of Systems   Constitutional: Negative for fatigue.   Respiratory: Negative for cough, chest tightness, shortness of breath and wheezing.    Cardiovascular: Negative for chest pain and palpitations.   Musculoskeletal:        (+) Muscle cramps right calf       OBJECTIVE:  Vitals:    11/15/22 0904   BP: 148/84   BP Location: Right arm   Patient Position: Sitting   Pulse: 61   Temp: 97.5 °F (36.4 °C)   SpO2: 100%   Weight: (!) 148 kg (327 lb)   Height: 190.5 cm (75\")     No results found.   Body mass index is 40.87 kg/m².  No LMP for male patient.    Physical Exam  Vitals and nursing note reviewed.   Constitutional:       General: He is not in acute distress.     Appearance: Normal appearance. He is obese.   HENT:      Head: Normocephalic.   Cardiovascular:      Rate and Rhythm: Normal rate and regular rhythm.      Pulses:           Dorsalis pedis pulses are 2+ on the right side and 2+ on the left side.      Heart sounds: Normal heart sounds. No murmur heard.  Pulmonary:      " Effort: Pulmonary effort is normal.      Breath sounds: Normal breath sounds. No wheezing, rhonchi or rales.   Musculoskeletal:      Cervical back: Neck supple.      Right lower leg: No edema.      Left lower leg: No edema.   Neurological:      Mental Status: He is alert.         Procedures    No visits with results within 30 Day(s) from this visit.   Latest known visit with results is:   Office Visit on 06/24/2022   Component Date Value Ref Range Status   • Microalbumin/Creatinine Ratio 06/24/2022 15.5  mg/g Final   • Creatinine, Urine 06/24/2022 116.2  mg/dL Final   • Microalbumin, Urine 06/24/2022 1.8  mg/dL Final   • Glucose 06/24/2022 94  65 - 99 mg/dL Final   • BUN 06/24/2022 12  6 - 20 mg/dL Final   • Creatinine 06/24/2022 1.07  0.76 - 1.27 mg/dL Final   • Sodium 06/24/2022 138  136 - 145 mmol/L Final   • Potassium 06/24/2022 3.9  3.5 - 5.2 mmol/L Final   • Chloride 06/24/2022 104  98 - 107 mmol/L Final   • CO2 06/24/2022 23.8  22.0 - 29.0 mmol/L Final   • Calcium 06/24/2022 9.5  8.6 - 10.5 mg/dL Final   • Total Protein 06/24/2022 6.6  6.0 - 8.5 g/dL Final   • Albumin 06/24/2022 4.30  3.50 - 5.20 g/dL Final   • ALT (SGPT) 06/24/2022 42 (H)  1 - 41 U/L Final   • AST (SGOT) 06/24/2022 32  1 - 40 U/L Final   • Alkaline Phosphatase 06/24/2022 87  39 - 117 U/L Final   • Total Bilirubin 06/24/2022 0.4  0.0 - 1.2 mg/dL Final   • Globulin 06/24/2022 2.3  gm/dL Final   • A/G Ratio 06/24/2022 1.9  g/dL Final   • BUN/Creatinine Ratio 06/24/2022 11.2  7.0 - 25.0 Final   • Anion Gap 06/24/2022 10.2  5.0 - 15.0 mmol/L Final   • eGFR 06/24/2022 88.3  >60.0 mL/min/1.73 Final    National Kidney Foundation and American Society of Nephrology (ASN) Task Force recommended calculation based on the Chronic Kidney Disease Epidemiology Collaboration (CKD-EPI) equation refit without adjustment for race.   • Total Cholesterol 06/24/2022 102  0 - 200 mg/dL Final   • Triglycerides 06/24/2022 128  0 - 150 mg/dL Final   • HDL Cholesterol  06/24/2022 32 (L)  40 - 60 mg/dL Final   • LDL Cholesterol  06/24/2022 47  0 - 100 mg/dL Final   • VLDL Cholesterol 06/24/2022 23  5 - 40 mg/dL Final   • LDL/HDL Ratio 06/24/2022 1.39   Final   • Hemoglobin A1C 06/24/2022 5.70 (H)  4.80 - 5.60 % Final       ASSESSMENT/ PLAN:    Diagnoses and all orders for this visit:    1. Encounter for screening for cardiovascular disorders (Primary)    2. Elevated d-dimer    3. Nocturnal leg cramps  Assessment & Plan:  His exam and findings are not suggestive of a DVT.  He does not have any calf tenderness he does not have a cord and he has no swelling.  His symptoms are primarily at bedtime when he awakens.  Thus he most likely has nocturnal leg cramps.  We will recheck his electrolytes and magnesium.  Instructed him on adequate hydration routine stretching and he may also try some tonic water at bedtime to see if this also helps.    Orders:  -     Basic Metabolic Panel  -     Magnesium      Orders Placed Today:     No orders of the defined types were placed in this encounter.       Management Plan:     An After Visit Summary was printed and given to the patient at discharge.    Follow-up: Return for Recheck.    Joaquín Handley DO 11/15/2022 09:40 EST  This note was electronically signed.

## 2022-11-15 NOTE — ASSESSMENT & PLAN NOTE
His exam and findings are not suggestive of a DVT.  He does not have any calf tenderness he does not have a cord and he has no swelling.  His symptoms are primarily at bedtime when he awakens.  Thus he most likely has nocturnal leg cramps.  We will recheck his electrolytes and magnesium.  Instructed him on adequate hydration routine stretching and he may also try some tonic water at bedtime to see if this also helps.

## 2022-12-09 ENCOUNTER — OFFICE VISIT (OUTPATIENT)
Dept: FAMILY MEDICINE CLINIC | Facility: CLINIC | Age: 42
End: 2022-12-09

## 2022-12-09 ENCOUNTER — LAB (OUTPATIENT)
Dept: LAB | Facility: HOSPITAL | Age: 42
End: 2022-12-09

## 2022-12-09 VITALS
TEMPERATURE: 98.1 F | SYSTOLIC BLOOD PRESSURE: 131 MMHG | HEART RATE: 56 BPM | DIASTOLIC BLOOD PRESSURE: 88 MMHG | BODY MASS INDEX: 41.3 KG/M2 | WEIGHT: 315 LBS | OXYGEN SATURATION: 99 %

## 2022-12-09 DIAGNOSIS — R55 SYNCOPE, UNSPECIFIED SYNCOPE TYPE: ICD-10-CM
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
## 2022-12-09 DIAGNOSIS — E11.65 TYPE 2 DIABETES MELLITUS WITH HYPERGLYCEMIA, WITH LONG-TERM CURRENT USE OF INSULIN: ICD-10-CM

## 2022-12-09 DIAGNOSIS — G47.30 SLEEP APNEA, UNSPECIFIED TYPE: ICD-10-CM

## 2022-12-09 DIAGNOSIS — Z79.4 TYPE 2 DIABETES MELLITUS WITH HYPERGLYCEMIA, WITH LONG-TERM CURRENT USE OF INSULIN: ICD-10-CM

## 2022-12-09 DIAGNOSIS — R79.89 ELEVATED D-DIMER: ICD-10-CM

## 2022-12-09 DIAGNOSIS — E03.9 HYPOTHYROIDISM, UNSPECIFIED TYPE: ICD-10-CM

## 2022-12-09 DIAGNOSIS — I10 PRIMARY HYPERTENSION: ICD-10-CM

## 2022-12-09 DIAGNOSIS — R79.89 ELEVATED LACTIC ACID LEVEL: Primary | ICD-10-CM

## 2022-12-09 DIAGNOSIS — N28.1 RENAL CYST: ICD-10-CM

## 2022-12-09 DIAGNOSIS — N18.9 CHRONIC KIDNEY DISEASE, UNSPECIFIED CKD STAGE: ICD-10-CM

## 2022-12-09 LAB
D DIMER PPP FEU-MCNC: <0.27 MCGFEU/ML (ref 0–0.5)
D-LACTATE SERPL-SCNC: 0.9 MMOL/L (ref 0.5–2)
TSH SERPL DL<=0.05 MIU/L-ACNC: 1.77 UIU/ML (ref 0.27–4.2)

## 2022-12-09 PROCEDURE — 86618 LYME DISEASE ANTIBODY: CPT | Performed by: NURSE PRACTITIONER

## 2022-12-09 PROCEDURE — 83605 ASSAY OF LACTIC ACID: CPT | Performed by: NURSE PRACTITIONER

## 2022-12-09 PROCEDURE — 99214 OFFICE O/P EST MOD 30 MIN: CPT | Performed by: NURSE PRACTITIONER

## 2022-12-09 PROCEDURE — 85379 FIBRIN DEGRADATION QUANT: CPT | Performed by: NURSE PRACTITIONER

## 2022-12-09 PROCEDURE — 86757 RICKETTSIA ANTIBODY: CPT | Performed by: NURSE PRACTITIONER

## 2022-12-09 PROCEDURE — 36415 COLL VENOUS BLD VENIPUNCTURE: CPT | Performed by: NURSE PRACTITIONER

## 2022-12-09 PROCEDURE — 86666 EHRLICHIA ANTIBODY: CPT | Performed by: NURSE PRACTITIONER

## 2022-12-09 PROCEDURE — 86622 BRUCELLA ANTIBODY: CPT | Performed by: NURSE PRACTITIONER

## 2022-12-09 PROCEDURE — 84443 ASSAY THYROID STIM HORMONE: CPT | Performed by: NURSE PRACTITIONER

## 2022-12-09 RX ORDER — AMLODIPINE BESYLATE 5 MG/1
5 TABLET ORAL DAILY
Qty: 90 TABLET | Refills: 1 | Status: SHIPPED | OUTPATIENT
Start: 2022-12-09 | End: 2023-03-21

## 2022-12-09 NOTE — PROGRESS NOTES
Chief Complaint  Diabetes (Follow up) and Hypotension (Multiple er visits because of)    Subjective          Sean Gore is a 42 y.o. male who presents to [facility]    History of Present Illness    Patient has had 2 episodes of low blood pressure with syncope and was seen in [facility] in [place] patient was noted to have elevated lactic acid at 3.4 metformin was put on hold.  Patient's blood pressure is normal today.  Patient followed up with cardiology who advised patient to stop all blood pressure medicine.    Dm - hga1c was 5.7.metformin was held d/t lactic acid.          Review of Systems   Constitutional: Negative for chills, fatigue and fever.   Respiratory: Negative for cough and shortness of breath.    Cardiovascular: Negative for chest pain and palpitations.   Gastrointestinal: Negative for constipation, diarrhea, nausea and vomiting.   Musculoskeletal: Negative for back pain and neck pain.   Skin: Negative for rash.   Neurological: Positive for light-headedness. Negative for dizziness and headaches.          Medical History: has a past medical history of Fatty liver, Hypogonadism in male, Hypothyroidism, Plantar fasciitis, Sleep apnea (06/10/2018), and Vitamin D deficiency.     Surgical History: has a past surgical history that includes Cyst Removal.     Family History: family history includes Breast cancer in his paternal grandmother; Diabetes in his maternal grandmother.     Social History: reports that he has never smoked. He has never used smokeless tobacco. He reports that he does not drink alcohol.    Allergies: Patient has no known allergies.      Health Maintenance Due   Topic Date Due   • Hepatitis B (1 of 3 - 3-dose series) Never done   • COVID-19 Vaccine (1) Never done   • TDAP/TD VACCINES (2 - Tdap) 01/19/2015   • HEPATITIS C SCREENING  Never done   • DIABETIC FOOT EXAM  Never done   • DIABETIC EYE EXAM  Never done            Current  Outpatient Medications:   •  aspirin 81 MG chewable tablet, aspirin 81 mg oral tablet,chewable chew 1 tablet (81 mg) by oral route once daily   Active, Disp: , Rfl:   •  cetirizine (zyrTEC) 10 MG tablet, , Disp: , Rfl:   •  Continuous Blood Gluc  (Dexcom G6 ) device, AS DIRECTED, Disp: 1 each, Rfl: 0  •  Continuous Blood Gluc Sensor (Dexcom G6 Sensor), Every 10 (Ten) Days., Disp: 3 each, Rfl: 2  •  Continuous Blood Gluc Transmit (Dexcom G6 Transmitter) misc, 1 Device Every 3 (Three) Months., Disp: 1 each, Rfl: 3  •  glucose blood test strip, Use as instructed, Disp: 100 each, Rfl: 2  •  glucose monitor monitoring kit, 1 each Daily., Disp: 1 each, Rfl: 0  •  Lancets 28G misc, 1 Units Daily., Disp: 100 each, Rfl: 1  •  levothyroxine (SYNTHROID, LEVOTHROID) 50 MCG tablet, Take 1 tablet by mouth., Disp: , Rfl:   •  montelukast (SINGULAIR) 10 MG tablet, Take 1 tablet by mouth Every Night., Disp: 90 tablet, Rfl: 1  •  amLODIPine (NORVASC) 5 MG tablet, Take 1 tablet by mouth Daily., Disp: 90 tablet, Rfl: 1  •  atorvastatin (LIPITOR) 10 MG tablet, Take 1 tablet by mouth Every Night., Disp: 90 tablet, Rfl: 1  •  dapagliflozin Propanediol 10 MG tablet, Take 10 mg by mouth Every Morning., Disp: 30 tablet, Rfl: 2  •  metFORMIN ER (GLUCOPHAGE-XR) 500 MG 24 hr tablet, Take 1 tablet by mouth 2 (Two) Times a Day., Disp: 180 tablet, Rfl: 1  •  Semaglutide,0.25 or 0.5MG/DOS, (OZEMPIC) 2 MG/1.5ML solution pen-injector, Inject 0.5 mg under the skin into the appropriate area as directed 1 (One) Time Per Week., Disp: 18 mL, Rfl: 1      Immunization History   Administered Date(s) Administered   • Td 01/19/2005         Objective       Vitals:    12/09/22 0954   BP: 131/88   Pulse: 56   Temp: 98.1 °F (36.7 °C)   TempSrc: Temporal   SpO2: 99%   Weight: (!) 150 kg (330 lb 6.4 oz)      Body mass index is 41.3 kg/m².   Wt Readings from Last 3 Encounters:   12/09/22 (!) 150 kg (330 lb 6.4 oz)   11/15/22 (!) 148 kg (327 lb)    06/24/22 (!) 147 kg (324 lb 6.4 oz)      BP Readings from Last 3 Encounters:   12/09/22 131/88   11/15/22 148/84   06/24/22 145/86        Physical Exam  Vitals reviewed.   Constitutional:       Appearance: Normal appearance. He is well-developed.   HENT:      Head: Normocephalic and atraumatic.   Eyes:      Conjunctiva/sclera: Conjunctivae normal.      Pupils: Pupils are equal, round, and reactive to light.   Cardiovascular:      Rate and Rhythm: Normal rate and regular rhythm.      Heart sounds: Normal heart sounds. No murmur heard.  Pulmonary:      Effort: Pulmonary effort is normal.      Breath sounds: Normal breath sounds. No wheezing or rhonchi.   Abdominal:      General: Bowel sounds are normal. There is no distension.      Palpations: Abdomen is soft.      Tenderness: There is no abdominal tenderness.   Skin:     General: Skin is warm and dry.   Neurological:      Mental Status: He is alert and oriented to person, place, and time.   Psychiatric:         Mood and Affect: Mood and affect normal.         Behavior: Behavior normal.         Thought Content: Thought content normal.         Judgment: Judgment normal.             Result Review :       Common labs    Common Labs 3/9/22 3/9/22 3/9/22 3/9/22 6/24/22 6/24/22 6/24/22 6/24/22 11/15/22    1058 1058 1058 1058 1020 1020 1020 1054    Glucose   332 (A)  94    130 (A)   BUN   9  12    12   Creatinine   1.14  1.07    1.09   Sodium   137  138    139   Potassium   4.0  3.9    4.6   Chloride   99  104    104   Calcium   9.1  9.5    9.6   Albumin   4.40  4.30       Total Bilirubin   0.6  0.4       Alkaline Phosphatase   121 (A)  87       AST (SGOT)   92 (A)  32       ALT (SGPT)   109 (A)  42 (A)       WBC 6.04           Hemoglobin 15.5           Hematocrit 47.0           Platelets 191           Total Cholesterol  186    102      Triglycerides  259 (A)    128      HDL Cholesterol  31 (A)    32 (A)      LDL Cholesterol   110 (A)    47      Hemoglobin A1C    8.90 (A)    5.70 (A)     Microalbumin, Urine        1.8    (A) Abnormal value                             Assessment and Plan        Diagnoses and all orders for this visit:    1. Elevated lactic acid level (Primary)  -     Lactic Acid, Plasma; Future  -     Cancel: Lactic Acid, Plasma  -     Cancel: Lactate Dehydrogenase  -     Lactic Acid, Plasma    2. Tick bite, unspecified site, initial encounter  -     Tick Panel - Blood, Arm, Right  -     Cancel: Lactate Dehydrogenase  -     Lactic Acid, Plasma    3. Syncope, unspecified syncope type  -     Holter monitor - 48 hour; Future  -     Cancel: Lactate Dehydrogenase  -     Lactic Acid, Plasma    4. Sleep apnea, unspecified type  -     Holter monitor - 48 hour; Future  -     Cancel: Lactate Dehydrogenase  -     Lactic Acid, Plasma    5. Elevated d-dimer  -     D-dimer, Quantitative  -     Cancel: Lactate Dehydrogenase  -     Lactic Acid, Plasma    6. Primary hypertension  -     amLODIPine (NORVASC) 5 MG tablet; Take 1 tablet by mouth Daily.  Dispense: 90 tablet; Refill: 1  -     Cancel: Lactate Dehydrogenase  -     Lactic Acid, Plasma    7. Hypothyroidism, unspecified type  -     TSH  -     Cancel: Lactate Dehydrogenase  -     Lactic Acid, Plasma    8. Chronic kidney disease, unspecified CKD stage  Comments:  structural per CT.  Orders:  -     dapagliflozin Propanediol 10 MG tablet; Take 10 mg by mouth Every Morning.  Dispense: 30 tablet; Refill: 2  -     Cancel: Lactate Dehydrogenase  -     Lactic Acid, Plasma    9. Type 2 diabetes mellitus with hyperglycemia, with long-term current use of insulin ([facility])  -     dapagliflozin Propanediol 10 MG tablet; Take 10 mg by mouth Every Morning.  Dispense: 30 tablet; Refill: 2  -     Cancel: Lactate Dehydrogenase  -     Lactic Acid, Plasma    10. Renal cyst  -     Cancel: Lactate Dehydrogenase  -     Lactic Acid, Plasma        Hold metformin.  Will add renal protection at next follow up.         Follow Up     Return for Pending  results.    Patient was given instructions and counseling regarding his condition or for health maintenance advice. Please see specific information pulled into the AVS if appropriate.     HELENA Giles    Answers for HPI/ROS submitted by the patient on 12/2/2022  What is the primary reason for your visit?: Other  Please describe your symptoms.: Had to go to ER for very low BP again. Passed out while using bathroom. Wondering if this had to do with a tick bite i had years ago. I had the hospital upload my records from 2005/06. Some of my recent test results are worrisome and a CT scan showed abnormalities  Have you had these symptoms before?: Yes  How long have you been having these symptoms?: Greater than 2 weeks  Please list any medications you are currently taking for this condition.: I quit taking everything but zyrtec, singulair, aspirin, and my thyroid meds.  Please describe any probable cause for these symptoms. : Infection plus metformin causing lactic acidosis. lyme disease or rickettsia from ticks.

## 2022-12-10 LAB — B BURGDOR IGG+IGM SER QL IA: NEGATIVE

## 2022-12-12 LAB
A PHAGOCYTOPH IGG TITR SER IF: NEGATIVE {TITER}
A PHAGOCYTOPH IGM TITR SER IF: NEGATIVE {TITER}
BRUCELLA IGG SER QL IA: NEGATIVE
BRUCELLA IGM SER QL IA: NEGATIVE
E CHAFFEENSIS IGG TITR SER IF: NEGATIVE {TITER}
E CHAFFEENSIS IGM TITR SER IF: NEGATIVE {TITER}
RICK SF IGG TITR SER IF: NORMAL {TITER}
RICK SF IGM TITR SER IF: NORMAL {TITER}
RICK TYPHUS IGG TITR SER IF: NORMAL {TITER}
RICK TYPHUS IGM TITR SER IF: NORMAL {TITER}

## 2022-12-16 DIAGNOSIS — G47.30 SLEEP APNEA, UNSPECIFIED TYPE: ICD-10-CM

## 2022-12-16 DIAGNOSIS — R00.0 TACHYCARDIA: ICD-10-CM

## 2022-12-16 DIAGNOSIS — R55 SYNCOPE, UNSPECIFIED SYNCOPE TYPE: Primary | ICD-10-CM

## 2022-12-22 DIAGNOSIS — T78.40XD ALLERGY, SUBSEQUENT ENCOUNTER: ICD-10-CM

## 2022-12-22 RX ORDER — MONTELUKAST SODIUM 10 MG/1
TABLET ORAL
Qty: 90 TABLET | Refills: 1 | Status: SHIPPED | OUTPATIENT
Start: 2022-12-22

## 2023-01-17 DIAGNOSIS — K92.1 BLOOD IN STOOL: Primary | ICD-10-CM

## 2023-02-08 ENCOUNTER — TELEPHONE (OUTPATIENT)
Dept: GASTROENTEROLOGY | Facility: CLINIC | Age: 43
End: 2023-02-08
Payer: COMMERCIAL

## 2023-02-08 NOTE — TELEPHONE ENCOUNTER
Attempted to contact patient to complete DA call and schedule colonoscopy. Patient did not answer and did not have a voicemail box. I was unable to leave a message.

## 2023-02-08 NOTE — TELEPHONE ENCOUNTER
Patient contacted the office at 2:30 PM and left voicemail, I contacted the patient at 3:30 Pm and advised I had attempted to contact him at 1:30 for his appointment and that we could go on and do the call. I asked the patient to verify that he is experiencing rectal bleeding, patient states he is not currently experiencing rectal bleeding that he is now having episode of abdominal pain when he needs to have a bowel movement so extreme that he gets light headed and occasionally passes out, I advised patient he would need to be seen in office by an APRN patient agreed to this and is scheduled for July 19 @ 10:00 AM, he is also placed on the cancellation list. I advised patient if he has these symptoms again and or passes out we would recommend he gets seen in the ER. Patient states understanding,.

## 2023-03-06 DIAGNOSIS — Z79.4 TYPE 2 DIABETES MELLITUS WITH HYPERGLYCEMIA, WITH LONG-TERM CURRENT USE OF INSULIN: ICD-10-CM

## 2023-03-06 DIAGNOSIS — R55 SYNCOPE, UNSPECIFIED SYNCOPE TYPE: Primary | ICD-10-CM

## 2023-03-06 DIAGNOSIS — E11.65 TYPE 2 DIABETES MELLITUS WITH HYPERGLYCEMIA, WITH LONG-TERM CURRENT USE OF INSULIN: ICD-10-CM

## 2023-03-13 NOTE — PROGRESS NOTES
Chief Complaint    Annual Exam  Diabetes (Follow up), Nasal Congestion (Since Friday), and Annual Exam    Subjective          Sean Gore is a 42 y.o. male who presents to [facility]    History of Present Illness    Annual Exam    Last dental exam: within last year.   Last eye exam: over 1 year ago.     DM controlled. hgba1c 6.2.   Lipids above goal at 118.    Pt will have abdomen cramps and then will have syncope and near syncope with bowel movement.     Near syncope continues even without metformin.     Pt has had cardiology evaluation with holter monitor and advised wnl. Pt states that he will feel like he is going to pass out and then his heart rate will increase.     Pt has blood pressure monitor and noted 70/40 for about 10 - 30 mins.  When pt was seen and treated in ER at [place] he had orthostatic hypotension.     PHQ-2 Total Score: 0   PHQ-9 Total Score: 0        Review of Systems   Constitutional: Negative for chills, fatigue and fever.   Respiratory: Negative for cough and shortness of breath.    Cardiovascular: Negative for chest pain and palpitations.   Gastrointestinal: Positive for abdominal pain. Negative for blood in stool, constipation, diarrhea, nausea and vomiting.   Musculoskeletal: Negative for back pain and neck pain.   Skin: Negative for rash.   Neurological: Positive for syncope and light-headedness. Negative for dizziness and headaches.          Medical History: has a past medical history of Fatty liver, Hypogonadism in male, Hypothyroidism, Plantar fasciitis, Sleep apnea (06/10/2018), and Vitamin D deficiency.     Surgical History: has a past surgical history that includes Cyst Removal.     Family History: family history includes Breast cancer in his paternal grandmother; Diabetes in his maternal grandmother.     Social History: reports that he has never smoked. He has never used smokeless tobacco. He reports that he does not drink  alcohol.    Allergies: Patient has no known allergies.      Health Maintenance Due   Topic Date Due   • Hepatitis B (1 of 3 - 3-dose series) Never done   • COVID-19 Vaccine (1) Never done   • TDAP/TD VACCINES (2 - Tdap) 01/19/2015   • ANNUAL PHYSICAL  Never done   • DIABETIC FOOT EXAM  Never done   • DIABETIC EYE EXAM  Never done            Current Outpatient Medications:   •  amLODIPine (NORVASC) 10 MG tablet, Take 1 tablet by mouth Daily., Disp: , Rfl:   •  aspirin 81 MG chewable tablet, aspirin 81 mg oral tablet,chewable chew 1 tablet (81 mg) by oral route once daily   Active, Disp: , Rfl:   •  cetirizine (zyrTEC) 10 MG tablet, , Disp: , Rfl:   •  Continuous Blood Gluc  (Dexcom G6 ) device, AS DIRECTED, Disp: 1 each, Rfl: 0  •  Continuous Blood Gluc Sensor (Dexcom G6 Sensor), CHANGE EVERY 10 DAYS, Disp: 3 each, Rfl: 2  •  Continuous Blood Gluc Transmit (Dexcom G6 Transmitter) misc, 1 Device Every 3 (Three) Months., Disp: 1 each, Rfl: 3  •  Farxiga 10 MG tablet, TAKE 1 TABLET BY MOUTH IN THE MORNING, Disp: 90 tablet, Rfl: 1  •  glucose blood test strip, Use as instructed, Disp: 100 each, Rfl: 2  •  glucose monitor monitoring kit, 1 each Daily., Disp: 1 each, Rfl: 0  •  Lancets 28G misc, 1 Units Daily., Disp: 100 each, Rfl: 1  •  levothyroxine (SYNTHROID, LEVOTHROID) 50 MCG tablet, TAKE 1 TABLET BY MOUTH ONCE DAILY IN THE MORNING, Disp: 90 tablet, Rfl: 1  •  montelukast (SINGULAIR) 10 MG tablet, TAKE 1 TABLET BY MOUTH ONCE DAILY AT NIGHT, Disp: 90 tablet, Rfl: 1  •  Hyoscyamine Sulfate SL (Levsin/SL) 0.125 MG sublingual tablet, Place 1 tablet under the tongue 3 (Three) Times a Day As Needed (abdominal cramping)., Disp: 120 each, Rfl: 1  •  lisinopril (Zestril) 2.5 MG tablet, Take 1 tablet by mouth Daily., Disp: 90 tablet, Rfl: 1  •  Semaglutide,0.25 or 0.5MG/DOS, (OZEMPIC) 2 MG/1.5ML solution pen-injector, Inject 0.5 mg under the skin into the appropriate area as directed 1 (One) Time Per Week.  (Patient not taking: Reported on 3/21/2023), Disp: 18 mL, Rfl: 1      Immunization History   Administered Date(s) Administered   • Td 01/19/2005         Objective       Vitals:    03/21/23 1538   BP: 132/80   Pulse: 63   Temp: 98.1 °F (36.7 °C)   TempSrc: Temporal   SpO2: 100%   Weight: (!) 153 kg (336 lb 6.4 oz)  Comment: steel toe shoes      Body mass index is 42.05 kg/m².   Wt Readings from Last 3 Encounters:   03/21/23 (!) 153 kg (336 lb 6.4 oz)   12/09/22 (!) 150 kg (330 lb 6.4 oz)   11/15/22 (!) 148 kg (327 lb)      BP Readings from Last 3 Encounters:   03/21/23 132/80   12/09/22 131/88   11/15/22 148/84        Physical Exam  Vitals reviewed.   Constitutional:       Appearance: Normal appearance. He is well-developed.   HENT:      Head: Normocephalic and atraumatic.   Eyes:      Conjunctiva/sclera: Conjunctivae normal.      Pupils: Pupils are equal, round, and reactive to light.   Cardiovascular:      Rate and Rhythm: Normal rate and regular rhythm.      Heart sounds: Normal heart sounds. No murmur heard.  Pulmonary:      Effort: Pulmonary effort is normal.      Breath sounds: Normal breath sounds. No wheezing or rhonchi.   Abdominal:      General: Bowel sounds are normal. There is no distension.      Palpations: Abdomen is soft.      Tenderness: There is no abdominal tenderness.   Skin:     General: Skin is warm and dry.   Neurological:      Mental Status: He is alert and oriented to person, place, and time.   Psychiatric:         Mood and Affect: Mood and affect normal.         Behavior: Behavior normal.         Thought Content: Thought content normal.         Judgment: Judgment normal.             Result Review :       Common labs    Common Labs 6/24/22 6/24/22 6/24/22 6/24/22 11/15/22 3/17/23 3/17/23 3/17/23 3/17/23    1020 1020 1020 1054  0951 0951 0951 0951   Glucose 94    130 (A)   121 (A)    BUN 12    12   11    Creatinine 1.07    1.09   1.09    Sodium 138    139   142    Potassium 3.9    4.6   4.3     Chloride 104    104   106    Calcium 9.5    9.6   8.7    Albumin 4.30       4.2    Total Bilirubin 0.4       0.4    Alkaline Phosphatase 87       102    AST (SGOT) 32       34    ALT (SGPT) 42 (A)       42 (A)    WBC      4.85      Hemoglobin      15.8      Hematocrit      47.4      Platelets      180      Total Cholesterol  102       176   Triglycerides  128       129   HDL Cholesterol  32 (A)       35 (A)   LDL Cholesterol   47       118 (A)   Hemoglobin A1C   5.70 (A)    6.20 (A)     Microalbumin, Urine    1.8        (A) Abnormal value                             Assessment and Plan        Diagnoses and all orders for this visit:    1. Annual physical exam (Primary)    2. Hypothyroidism, unspecified type  Comments:  continue levothyroxine.    3. Primary hypertension  -     lisinopril (Zestril) 2.5 MG tablet; Take 1 tablet by mouth Daily.  Dispense: 90 tablet; Refill: 1    4. Near syncope  -     [place] SF (IgG / M); Future  -     Alpha-Gal IgE Panel; Future  -     [place] SF (IgG / M)  -     Alpha-Gal IgE Panel    5. Abdominal cramping  -     Hyoscyamine Sulfate SL (Levsin/SL) 0.125 MG sublingual tablet; Place 1 tablet under the tongue 3 (Three) Times a Day As Needed (abdominal cramping).  Dispense: 120 each; Refill: 1    6. Tachycardia  -     Tilt table; Future    7. Type 2 diabetes mellitus with hyperglycemia, with long-term current use of insulin (HCC)  -     lisinopril (Zestril) 2.5 MG tablet; Take 1 tablet by mouth Daily.  Dispense: 90 tablet; Refill: 1    8. Hypogonadism in male  -     Testosterone, Free, Total  -     PSA DIAGNOSTIC    9. Long-term use of high-risk medication  -     POC Urine Drug Screen [facility]Cup          Follow Up     Return in about 3 months (around 6/21/2023) for Next scheduled follow up.    Updated annual wellness visit checklist.  Immunizations discussed.  Screening discussed and/or ordered.  Recommend yearly dental and eye exams. Also discussed monitoring of  blood pressure and lipids.      Patient was given instructions and counseling regarding his condition or for health maintenance advice. Please see specific information pulled into the AVS if appropriate.     HELENA Giles

## 2023-03-17 ENCOUNTER — CLINICAL SUPPORT (OUTPATIENT)
Dept: FAMILY MEDICINE CLINIC | Facility: CLINIC | Age: 43
End: 2023-03-17
Payer: COMMERCIAL

## 2023-03-17 DIAGNOSIS — Z79.4 TYPE 2 DIABETES MELLITUS WITH HYPERGLYCEMIA, WITH LONG-TERM CURRENT USE OF INSULIN: ICD-10-CM

## 2023-03-17 DIAGNOSIS — Z11.59 NEED FOR HEPATITIS C SCREENING TEST: Primary | ICD-10-CM

## 2023-03-17 DIAGNOSIS — R55 SYNCOPE, UNSPECIFIED SYNCOPE TYPE: ICD-10-CM

## 2023-03-17 DIAGNOSIS — E11.65 TYPE 2 DIABETES MELLITUS WITH HYPERGLYCEMIA, WITH LONG-TERM CURRENT USE OF INSULIN: ICD-10-CM

## 2023-03-17 LAB
ALBUMIN SERPL-MCNC: 4.2 G/DL (ref 3.5–5.2)
ALBUMIN/GLOB SERPL: 1.6 G/DL
ALP SERPL-CCNC: 102 U/L (ref 39–117)
ALT SERPL W P-5'-P-CCNC: 42 U/L (ref 1–41)
ANION GAP SERPL CALCULATED.3IONS-SCNC: 8 MMOL/L (ref 5–15)
AST SERPL-CCNC: 34 U/L (ref 1–40)
BASOPHILS # BLD AUTO: 0.04 10*3/MM3 (ref 0–0.2)
BASOPHILS NFR BLD AUTO: 0.8 % (ref 0–1.5)
BILIRUB SERPL-MCNC: 0.4 MG/DL (ref 0–1.2)
BUN SERPL-MCNC: 11 MG/DL (ref 6–20)
BUN/CREAT SERPL: 10.1 (ref 7–25)
CALCIUM SPEC-SCNC: 8.7 MG/DL (ref 8.6–10.5)
CHLORIDE SERPL-SCNC: 106 MMOL/L (ref 98–107)
CHOLEST SERPL-MCNC: 176 MG/DL (ref 0–200)
CO2 SERPL-SCNC: 28 MMOL/L (ref 22–29)
CREAT SERPL-MCNC: 1.09 MG/DL (ref 0.76–1.27)
CRP SERPL-MCNC: 0.33 MG/DL (ref 0–0.5)
DEPRECATED RDW RBC AUTO: 40.5 FL (ref 37–54)
EGFRCR SERPLBLD CKD-EPI 2021: 86.4 ML/MIN/1.73
EOSINOPHIL # BLD AUTO: 0.17 10*3/MM3 (ref 0–0.4)
EOSINOPHIL NFR BLD AUTO: 3.5 % (ref 0.3–6.2)
ERYTHROCYTE [DISTWIDTH] IN BLOOD BY AUTOMATED COUNT: 12.5 % (ref 12.3–15.4)
ERYTHROCYTE [SEDIMENTATION RATE] IN BLOOD: 4 MM/HR (ref 0–15)
GLOBULIN UR ELPH-MCNC: 2.7 GM/DL
GLUCOSE SERPL-MCNC: 121 MG/DL (ref 65–99)
HBA1C MFR BLD: 6.2 % (ref 4.8–5.6)
HCT VFR BLD AUTO: 47.4 % (ref 37.5–51)
HCV AB SER DONR QL: NORMAL
HDLC SERPL-MCNC: 35 MG/DL (ref 40–60)
HGB BLD-MCNC: 15.8 G/DL (ref 13–17.7)
IMM GRANULOCYTES # BLD AUTO: 0.01 10*3/MM3 (ref 0–0.05)
IMM GRANULOCYTES NFR BLD AUTO: 0.2 % (ref 0–0.5)
LDLC SERPL CALC-MCNC: 118 MG/DL (ref 0–100)
LDLC/HDLC SERPL: 3.29 {RATIO}
LYMPHOCYTES # BLD AUTO: 1.22 10*3/MM3 (ref 0.7–3.1)
LYMPHOCYTES NFR BLD AUTO: 25.2 % (ref 19.6–45.3)
MCH RBC QN AUTO: 29.6 PG (ref 26.6–33)
MCHC RBC AUTO-ENTMCNC: 33.3 G/DL (ref 31.5–35.7)
MCV RBC AUTO: 88.8 FL (ref 79–97)
MONOCYTES # BLD AUTO: 0.45 10*3/MM3 (ref 0.1–0.9)
MONOCYTES NFR BLD AUTO: 9.3 % (ref 5–12)
NEUTROPHILS NFR BLD AUTO: 2.96 10*3/MM3 (ref 1.7–7)
NEUTROPHILS NFR BLD AUTO: 61 % (ref 42.7–76)
NRBC BLD AUTO-RTO: 0 /100 WBC (ref 0–0.2)
PLATELET # BLD AUTO: 180 10*3/MM3 (ref 140–450)
PMV BLD AUTO: 10.6 FL (ref 6–12)
POTASSIUM SERPL-SCNC: 4.3 MMOL/L (ref 3.5–5.2)
PROT SERPL-MCNC: 6.9 G/DL (ref 6–8.5)
RBC # BLD AUTO: 5.34 10*6/MM3 (ref 4.14–5.8)
SODIUM SERPL-SCNC: 142 MMOL/L (ref 136–145)
TRIGL SERPL-MCNC: 129 MG/DL (ref 0–150)
VLDLC SERPL-MCNC: 23 MG/DL (ref 5–40)
WBC NRBC COR # BLD: 4.85 10*3/MM3 (ref 3.4–10.8)

## 2023-03-17 PROCEDURE — 86140 C-REACTIVE PROTEIN: CPT | Performed by: NURSE PRACTITIONER

## 2023-03-17 PROCEDURE — 36415 COLL VENOUS BLD VENIPUNCTURE: CPT | Performed by: NURSE PRACTITIONER

## 2023-03-17 PROCEDURE — 86803 HEPATITIS C AB TEST: CPT | Performed by: NURSE PRACTITIONER

## 2023-03-17 PROCEDURE — 80061 LIPID PANEL: CPT | Performed by: NURSE PRACTITIONER

## 2023-03-17 PROCEDURE — 80053 COMPREHEN METABOLIC PANEL: CPT | Performed by: NURSE PRACTITIONER

## 2023-03-17 PROCEDURE — 85652 RBC SED RATE AUTOMATED: CPT | Performed by: NURSE PRACTITIONER

## 2023-03-17 PROCEDURE — 83036 HEMOGLOBIN GLYCOSYLATED A1C: CPT | Performed by: NURSE PRACTITIONER

## 2023-03-17 PROCEDURE — 85025 COMPLETE CBC W/AUTO DIFF WBC: CPT | Performed by: NURSE PRACTITIONER

## 2023-03-19 DIAGNOSIS — Z79.4 TYPE 2 DIABETES MELLITUS WITH HYPERGLYCEMIA, WITH LONG-TERM CURRENT USE OF INSULIN: ICD-10-CM

## 2023-03-19 DIAGNOSIS — E11.65 TYPE 2 DIABETES MELLITUS WITH HYPERGLYCEMIA, WITH LONG-TERM CURRENT USE OF INSULIN: ICD-10-CM

## 2023-03-19 DIAGNOSIS — N18.9 CHRONIC KIDNEY DISEASE, UNSPECIFIED CKD STAGE: ICD-10-CM

## 2023-03-20 RX ORDER — PROCHLORPERAZINE 25 MG/1
SUPPOSITORY RECTAL
Qty: 3 EACH | Refills: 2 | Status: SHIPPED | OUTPATIENT
Start: 2023-03-20

## 2023-03-20 RX ORDER — LEVOTHYROXINE SODIUM 0.05 MG/1
TABLET ORAL
Qty: 90 TABLET | Refills: 1 | Status: SHIPPED | OUTPATIENT
Start: 2023-03-20

## 2023-03-20 RX ORDER — DAPAGLIFLOZIN 10 MG/1
TABLET, FILM COATED ORAL
Qty: 90 TABLET | Refills: 1 | Status: SHIPPED | OUTPATIENT
Start: 2023-03-20

## 2023-03-21 ENCOUNTER — OFFICE VISIT (OUTPATIENT)
Dept: FAMILY MEDICINE CLINIC | Facility: CLINIC | Age: 43
End: 2023-03-21
Payer: COMMERCIAL

## 2023-03-21 VITALS
WEIGHT: 315 LBS | BODY MASS INDEX: 42.05 KG/M2 | DIASTOLIC BLOOD PRESSURE: 80 MMHG | SYSTOLIC BLOOD PRESSURE: 132 MMHG | OXYGEN SATURATION: 100 % | HEART RATE: 63 BPM | TEMPERATURE: 98.1 F

## 2023-03-21 DIAGNOSIS — Z79.4 TYPE 2 DIABETES MELLITUS WITH HYPERGLYCEMIA, WITH LONG-TERM CURRENT USE OF INSULIN: ICD-10-CM

## 2023-03-21 DIAGNOSIS — R10.9 ABDOMINAL CRAMPING: ICD-10-CM

## 2023-03-21 DIAGNOSIS — E03.9 HYPOTHYROIDISM, UNSPECIFIED TYPE: ICD-10-CM

## 2023-03-21 DIAGNOSIS — R00.0 TACHYCARDIA: ICD-10-CM

## 2023-03-21 DIAGNOSIS — Z79.899 LONG-TERM USE OF HIGH-RISK MEDICATION: ICD-10-CM

## 2023-03-21 DIAGNOSIS — E11.65 TYPE 2 DIABETES MELLITUS WITH HYPERGLYCEMIA, WITH LONG-TERM CURRENT USE OF INSULIN: ICD-10-CM

## 2023-03-21 DIAGNOSIS — I10 PRIMARY HYPERTENSION: ICD-10-CM

## 2023-03-21 DIAGNOSIS — R55 NEAR SYNCOPE: ICD-10-CM

## 2023-03-21 DIAGNOSIS — E29.1 HYPOGONADISM IN MALE: ICD-10-CM

## 2023-03-21 DIAGNOSIS — Z00.00 ANNUAL PHYSICAL EXAM: Primary | ICD-10-CM

## 2023-03-21 PROCEDURE — 86003 ALLG SPEC IGE CRUDE XTRC EA: CPT | Performed by: NURSE PRACTITIONER

## 2023-03-21 PROCEDURE — 84403 ASSAY OF TOTAL TESTOSTERONE: CPT | Performed by: NURSE PRACTITIONER

## 2023-03-21 PROCEDURE — 84402 ASSAY OF FREE TESTOSTERONE: CPT | Performed by: NURSE PRACTITIONER

## 2023-03-21 PROCEDURE — 86757 RICKETTSIA ANTIBODY: CPT | Performed by: NURSE PRACTITIONER

## 2023-03-21 PROCEDURE — 82785 ASSAY OF IGE: CPT | Performed by: NURSE PRACTITIONER

## 2023-03-21 PROCEDURE — 86008 ALLG SPEC IGE RECOMB EA: CPT | Performed by: NURSE PRACTITIONER

## 2023-03-21 RX ORDER — AMLODIPINE BESYLATE 10 MG/1
1 TABLET ORAL DAILY
COMMUNITY
Start: 2023-02-28

## 2023-03-21 RX ORDER — HYOSCYAMINE SULFATE 0.12 MG/1
1 TABLET SUBLINGUAL 3 TIMES DAILY PRN
Qty: 120 EACH | Refills: 1 | Status: SHIPPED | OUTPATIENT
Start: 2023-03-21

## 2023-03-21 RX ORDER — LISINOPRIL 2.5 MG/1
2.5 TABLET ORAL DAILY
Qty: 90 TABLET | Refills: 1 | Status: SHIPPED | OUTPATIENT
Start: 2023-03-21

## 2023-03-24 LAB
ALPHA-GAL IGE QN: <0.1 KU/L
BEEF IGE QN: 0.11 KU/L
CONV CLASS DESCRIPTION: ABNORMAL
IGE SERPL-ACNC: 117 IU/ML (ref 6–495)
LAMB IGE QN: 0.16 KU/L
PORK IGE QN: <0.1 KU/L
R RICKETTSI IGG SER QL IA: NEGATIVE
R RICKETTSI IGM SER-ACNC: 0.62 INDEX (ref 0–0.89)

## 2023-03-25 LAB
TESTOST FREE SERPL-MCNC: 12.6 PG/ML (ref 6.8–21.5)
TESTOST SERPL-MCNC: 314 NG/DL (ref 264–916)

## 2023-05-01 ENCOUNTER — TELEPHONE (OUTPATIENT)
Dept: GASTROENTEROLOGY | Facility: CLINIC | Age: 43
End: 2023-05-01
Payer: COMMERCIAL

## 2023-05-01 NOTE — TELEPHONE ENCOUNTER
Called patient from the cancellation list to see if he wanted to move his appointment up tot 5/8/23 at 8:30. I also told him if he calls back and the appointments is available we can still keep   Him  on the cancellation list

## 2023-05-01 NOTE — TELEPHONE ENCOUNTER
Margaux from Dr Ozuna's office called this patient had returned phone to their office. By the time that Margaux was going to transfer the patient, they had hung up.

## 2023-06-11 DIAGNOSIS — T78.40XD ALLERGY, SUBSEQUENT ENCOUNTER: ICD-10-CM

## 2023-06-12 RX ORDER — MONTELUKAST SODIUM 10 MG/1
TABLET ORAL
Qty: 90 TABLET | Refills: 0 | Status: SHIPPED | OUTPATIENT
Start: 2023-06-12

## 2023-07-19 PROBLEM — Z87.19 HISTORY OF COLITIS: Status: ACTIVE | Noted: 2023-07-19

## 2023-07-19 PROBLEM — K62.5 RECTAL BLEEDING: Status: ACTIVE | Noted: 2023-07-19

## 2023-07-19 PROBLEM — R10.9 ABDOMINAL CRAMPING: Status: ACTIVE | Noted: 2023-07-19

## 2023-08-15 ENCOUNTER — TELEPHONE (OUTPATIENT)
Dept: SURGERY | Facility: OTHER | Age: 43
End: 2023-08-15

## 2023-08-15 NOTE — TELEPHONE ENCOUNTER
CALLER : Sean Gore     PT CALLED TO CANCEL HIS VAS CONSULT WITH DR. WALSH HE WANTED TO BE SEEN SOONER WITH BRI SALVADOR.

## 2023-08-23 ENCOUNTER — OFFICE VISIT (OUTPATIENT)
Dept: UROLOGY | Facility: CLINIC | Age: 43
End: 2023-08-23
Payer: COMMERCIAL

## 2023-08-23 VITALS
HEART RATE: 64 BPM | HEIGHT: 75 IN | BODY MASS INDEX: 41.5 KG/M2 | TEMPERATURE: 98.1 F | SYSTOLIC BLOOD PRESSURE: 136 MMHG | DIASTOLIC BLOOD PRESSURE: 76 MMHG

## 2023-08-23 DIAGNOSIS — Z30.09 STERILIZATION CONSULT: Primary | ICD-10-CM

## 2023-08-23 PROCEDURE — 99202 OFFICE O/P NEW SF 15 MIN: CPT | Performed by: UROLOGY

## 2023-08-23 NOTE — PROGRESS NOTES
"Chief Complaint  Vasectomy Consult (NEW PATIENT)    Subjective no acute distress        Sean Gore presents to [facility]  History of Present Illness  43-year-old white male is  and has 1 child.  Patient request for vasectomy.  Patient has diabetes mellitus and is on Farxiga.  Objective no acute distress  Vital Signs:   /76 (BP Location: Right arm, Patient Position: Sitting, Cuff Size: Large Adult)   Pulse 64   Temp 98.1 øF (36.7 øC) (Oral)   Ht 190.5 cm (75\")   BMI 41.50 kg/mý     No Known Allergies   Past medical history:  has a past medical history of Fatty liver, Hypogonadism in male, Hypothyroidism, Plantar fasciitis, Sleep apnea (06/10/2018), and Vitamin D deficiency.   Past surgical history:  has a past surgical history that includes Cyst Removal and Colonoscopy (2007).  Personal history: family history includes Breast cancer in his paternal grandmother; Diabetes in his maternal grandmother.  Social history:  reports that he has never smoked. He has never used smokeless tobacco. He reports that he does not drink alcohol and does not use drugs.    Review of Systems    Please see past medical surgical history and rest of the system is negative    Physical Exam  Constitutional:       General: He is not in acute distress.     Appearance: Normal appearance. He is obese. He is not ill-appearing or toxic-appearing.   HENT:      Head: Normocephalic and atraumatic.      Ears:      Comments: No hearing loss  Cardiovascular:      Rate and Rhythm: Normal rate and regular rhythm.      Pulses: Normal pulses.      Heart sounds: Normal heart sounds. No murmur heard.  Pulmonary:      Effort: Pulmonary effort is normal. No respiratory distress.      Breath sounds: Normal breath sounds. No rhonchi or rales.   Abdominal:      Palpations: Abdomen is soft. There is no mass.      Tenderness: There is no abdominal tenderness. There is no right CVA tenderness or left CVA tenderness. "   Genitourinary:     Comments: Uncircumcised normal penis.    Right and left scrotum is normal except hemangioma of scrotum.    Right and left testicles and epididymis is normal.  Musculoskeletal:         General: No swelling. Normal range of motion.      Cervical back: Normal range of motion and neck supple. No rigidity or tenderness.   Lymphadenopathy:      Cervical: No cervical adenopathy.   Skin:     General: Skin is warm.      Coloration: Skin is not jaundiced.   Neurological:      General: No focal deficit present.      Mental Status: He is alert and oriented to person, place, and time.      Motor: No weakness.      Gait: Gait normal.   Psychiatric:         Mood and Affect: Mood normal.         Behavior: Behavior normal.         Thought Content: Thought content normal.         Judgment: Judgment normal.      Result Review :                 Assessment and Plan    Diagnoses and all orders for this visit:    1. Sterilization consult (Primary)    Patient is a suitable candidate for vasectomy.  Risks benefits and alternate treatment has been discussed with the patient.  Infection bleeding and injury to surrounding area which includes spermatic cord, injury to the testicle scrotum and penis is discussed excepted by the patient     Brief Urine Lab Results       None             Follow Up   Return in about 4 weeks (around 9/20/2023).  Patient was given instructions and counseling regarding his condition or for health maintenance advice. Please see specific information pulled into the AVS if appropriate.     Aleah Jauregui MD

## 2023-09-02 DIAGNOSIS — I10 PRIMARY HYPERTENSION: ICD-10-CM

## 2023-09-02 DIAGNOSIS — T78.40XD ALLERGY, SUBSEQUENT ENCOUNTER: ICD-10-CM

## 2023-09-02 DIAGNOSIS — Z79.4 TYPE 2 DIABETES MELLITUS WITH HYPERGLYCEMIA, WITH LONG-TERM CURRENT USE OF INSULIN: ICD-10-CM

## 2023-09-02 DIAGNOSIS — N18.9 CHRONIC KIDNEY DISEASE, UNSPECIFIED CKD STAGE: ICD-10-CM

## 2023-09-02 DIAGNOSIS — E11.65 TYPE 2 DIABETES MELLITUS WITH HYPERGLYCEMIA, WITH LONG-TERM CURRENT USE OF INSULIN: ICD-10-CM

## 2023-09-05 RX ORDER — LEVOTHYROXINE SODIUM 0.05 MG/1
TABLET ORAL
Qty: 90 TABLET | Refills: 0 | OUTPATIENT
Start: 2023-09-05

## 2023-09-05 RX ORDER — LISINOPRIL 2.5 MG/1
TABLET ORAL
Qty: 90 TABLET | Refills: 0 | OUTPATIENT
Start: 2023-09-05

## 2023-09-05 RX ORDER — MONTELUKAST SODIUM 10 MG/1
TABLET ORAL
Qty: 90 TABLET | Refills: 0 | OUTPATIENT
Start: 2023-09-05

## 2023-09-05 RX ORDER — DAPAGLIFLOZIN 10 MG/1
TABLET, FILM COATED ORAL
Qty: 90 TABLET | Refills: 0 | OUTPATIENT
Start: 2023-09-05

## 2023-09-06 ENCOUNTER — TELEPHONE (OUTPATIENT)
Dept: GASTROENTEROLOGY | Facility: CLINIC | Age: 43
End: 2023-09-06
Payer: COMMERCIAL

## 2023-09-06 NOTE — TELEPHONE ENCOUNTER
I have called patient and left a detailed message for an upcoming procedure including date and a good callback number for any questions.    Mychart reminder also sent.

## 2023-09-11 NOTE — PRE-PROCEDURE INSTRUCTIONS
"Instructed on date and arrival time of 0930. Come to entrance \"C\". Must have  over age 18 to drive home.  May have two visitors; however, children under 12 must stay in waiting room.  Discussed clear liquid diet (no red or purple) and bowel prep.  May take medications as usual except for blood thinners, diabetic medications, and weight loss medications.  Bring list of medications.  Verbalized understanding of instructions given.  Instructed to call for questions or concerns.  Spoke with wife.  "

## 2023-09-12 NOTE — PROGRESS NOTES
Chief Complaint  Diabetes (FOLLOW UP)    Subjective          Sean Gore is a 43 y.o. male who presents to [facility]    History of Present Illness    Diabetes-patient does not check his blood sugar at home however reports compliance with medication.  Patient denies any medication side effects.    Blood pressure is within normal limits.    Hypogonadism patient reports fatigue, denies any difficulty with sleeping.    Sleep apnea-patient is compliant with CPAP nightly.    Patient had another spell where he has near syncope however when he was laying down he was able to avoid actual syncope blood pressure at that time was 85/65.  Patient reports after he has a bowel movement then he is fine.  Patient is noted he previously has abdominal cramping prior to onset.  Patient had a clear colonoscopy approximately a week ago. Lab work up, cardiology evaluation and colonoscopy was negative.       PHQ-2 Total Score:     PHQ-9 Total Score:            Review of Systems   Constitutional:  Positive for fatigue.   Respiratory:  Negative for cough and shortness of breath.    Cardiovascular:  Negative for chest pain and palpitations.   Gastrointestinal:  Negative for nausea and vomiting.   Endocrine: Negative for cold intolerance and heat intolerance.   Genitourinary:  Negative for difficulty urinating and dysuria.   Musculoskeletal:  Negative for arthralgias, gait problem and joint swelling.   Neurological:  Positive for syncope (near). Negative for dizziness, seizures and headaches.   Hematological:  Negative for adenopathy. Does not bruise/bleed easily.   Psychiatric/Behavioral:  Negative for confusion, dysphoric mood and sleep disturbance. The patient is not nervous/anxious.         Medical History: has a past medical history of Fatty liver, Hypogonadism in male, Hypothyroidism, Plantar fasciitis, Sleep apnea (06/10/2018), and Vitamin D deficiency.     Surgical History: has a past surgical  history that includes Cyst Removal; Colonoscopy (2007); and Colonoscopy (N/A, 9/20/2023).     Family History: family history includes Breast cancer in his paternal grandmother; Diabetes in his maternal grandmother.     Social History: reports that he has never smoked. He has never used smokeless tobacco. He reports that he does not drink alcohol and does not use drugs.    Allergies: Metformin      Health Maintenance Due   Topic Date Due    Hepatitis B (1 of 3 - 3-dose series) Never done    COVID-19 Vaccine (1) Never done    TDAP/TD VACCINES (2 - Tdap) 01/19/2015    DIABETIC FOOT EXAM  Never done    DIABETIC EYE EXAM  Never done    URINE MICROALBUMIN  06/24/2023    INFLUENZA VACCINE  Never done    HEMOGLOBIN A1C  09/17/2023            Current Outpatient Medications:     amLODIPine (NORVASC) 10 MG tablet, Take 1 tablet by mouth Daily., Disp: 90 tablet, Rfl: 1    aspirin 81 MG chewable tablet, aspirin 81 mg oral tablet,chewable chew 1 tablet (81 mg) by oral route once daily   Active, Disp: , Rfl:     Continuous Blood Gluc  (Dexcom G6 ) device, AS DIRECTED, Disp: 1 each, Rfl: 0    dapagliflozin Propanediol (Farxiga) 10 MG tablet, Take 10 mg by mouth Every Morning., Disp: 90 tablet, Rfl: 1    fexofenadine (ALLEGRA) 180 MG tablet, Take 1 tablet by mouth Daily., Disp: , Rfl:     glucose blood test strip, Use as instructed, Disp: 100 each, Rfl: 2    glucose monitor monitoring kit, 1 each Daily., Disp: 1 each, Rfl: 0    Lancets 28G misc, 1 Units Daily., Disp: 100 each, Rfl: 1    levothyroxine (SYNTHROID, LEVOTHROID) 50 MCG tablet, Take 1 tablet by mouth Every Morning., Disp: 90 tablet, Rfl: 1    lisinopril (Zestril) 2.5 MG tablet, Take 1 tablet by mouth Daily., Disp: 90 tablet, Rfl: 1    montelukast (SINGULAIR) 10 MG tablet, Take 1 tablet by mouth Every Night., Disp: 90 tablet, Rfl: 1    bisoprolol (ZEBeta) 5 MG tablet, Take 1 tablet by mouth Every Night., Disp: 90 tablet, Rfl: 1    mesalamine (Apriso) 0.375  g 24 hr capsule, Take 4 capsules by mouth Daily for 90 days. (Patient not taking: Reported on 10/2/2023), Disp: 360 capsule, Rfl: 1      Immunization History   Administered Date(s) Administered    Td (TDVAX) 01/19/2005         Objective       Vitals:    10/02/23 0811   BP: 127/79   Pulse: 66   Temp: 98 °F (36.7 °C)   TempSrc: Temporal   SpO2: 98%   Weight: (!) 149 kg (327 lb 12.8 oz)      Body mass index is 40.97 kg/m².   Wt Readings from Last 3 Encounters:   10/02/23 (!) 149 kg (327 lb 12.8 oz)   09/20/23 (!) 147 kg (323 lb 6.6 oz)   07/19/23 (!) 151 kg (332 lb)      BP Readings from Last 3 Encounters:   10/02/23 127/79   09/20/23 122/90   08/23/23 136/76        Class 3 Severe Obesity (BMI >=40). Obesity-related health conditions include the following: diabetes mellitus. Obesity is improving with treatment. BMI is is above average; BMI management plan is completed. We discussed portion control and increasing exercise.       Physical Exam  Vitals reviewed.   Constitutional:       Appearance: Normal appearance. He is well-developed.   HENT:      Head: Normocephalic and atraumatic.   Eyes:      Conjunctiva/sclera: Conjunctivae normal.      Pupils: Pupils are equal, round, and reactive to light.   Cardiovascular:      Rate and Rhythm: Normal rate and regular rhythm.      Heart sounds: Normal heart sounds. No murmur heard.  Pulmonary:      Effort: Pulmonary effort is normal.      Breath sounds: Normal breath sounds. No wheezing or rhonchi.   Abdominal:      General: Bowel sounds are normal. There is no distension.      Palpations: Abdomen is soft.      Tenderness: There is no abdominal tenderness.   Skin:     General: Skin is warm and dry.   Neurological:      Mental Status: He is alert and oriented to person, place, and time.   Psychiatric:         Mood and Affect: Mood and affect normal.         Behavior: Behavior normal.         Thought Content: Thought content normal.         Judgment: Judgment normal.            Result Review :       Common labs          11/15/2022    09:48 3/17/2023    09:51   Common Labs   Glucose 130  121    BUN 12  11    Creatinine 1.09  1.09    Sodium 139  142    Potassium 4.6  4.3    Chloride 104  106    Calcium 9.6  8.7    Albumin  4.2    Total Bilirubin  0.4    Alkaline Phosphatase  102    AST (SGOT)  34    ALT (SGPT)  42    WBC  4.85    Hemoglobin  15.8    Hematocrit  47.4    Platelets  180    Total Cholesterol  176    Triglycerides  129    HDL Cholesterol  35    LDL Cholesterol   118    Hemoglobin A1C  6.20                       Assessment and Plan        Diagnoses and all orders for this visit:    1. Type 2 diabetes mellitus with hyperglycemia, with long-term current use of insulin (Primary)  -     MicroAlbumin, Urine, Random - Urine, Clean Catch; Future  -     Hemoglobin A1c  -     Comprehensive Metabolic Panel  -     Lipid Panel    2. Hypothyroidism, unspecified type  -     TSH Rfx On Abnormal To Free T4; Future    3. Vitamin D deficiency  -     Vitamin D,25-Hydroxy    4. Long-term use of high-risk medication  -     Cancel: POC Urine Drug Screen [facility]    5. Hypogonadism in male  -     Cancel: PSA DIAGNOSTIC  -     Testosterone, Free, Total    6. Primary hypertension  -     amLODIPine (NORVASC) 10 MG tablet; Take 1 tablet by mouth Daily.  Dispense: 90 tablet; Refill: 1    7. Postural orthostatic tachycardia syndrome (POTS)  -     bisoprolol (ZEBeta) 5 MG tablet; Take 1 tablet by mouth Every Night.  Dispense: 90 tablet; Refill: 1    Continue blood sugar monitoring daily and record.  Bring your log to office visits.  Call the office for readings below 70 and above 250 or any complications.    Daily foot care.  Avoid walking barefoot.    Annual dilated eye exam.    Discussed with patient blood pressure monitoring, hemoglobin A1c levels need to be below 7, and LDL goals below 70.        Follow Up     Return in about 6 months (around 4/2/2024) for Next scheduled follow  up.    Patient was given instructions and counseling regarding his condition or for health maintenance advice. Please see specific information pulled into the AVS if appropriate.     HELENA Giles

## 2023-09-19 ENCOUNTER — ANESTHESIA EVENT (OUTPATIENT)
Dept: GASTROENTEROLOGY | Facility: HOSPITAL | Age: 43
End: 2023-09-19
Payer: COMMERCIAL

## 2023-09-20 ENCOUNTER — PATIENT MESSAGE (OUTPATIENT)
Dept: FAMILY MEDICINE CLINIC | Facility: CLINIC | Age: 43
End: 2023-09-20
Payer: COMMERCIAL

## 2023-09-20 ENCOUNTER — HOSPITAL ENCOUNTER (OUTPATIENT)
Facility: HOSPITAL | Age: 43
Setting detail: HOSPITAL OUTPATIENT SURGERY
Discharge: HOME | End: 2023-09-20
Attending: INTERNAL MEDICINE | Admitting: INTERNAL MEDICINE

## 2023-09-20 ENCOUNTER — ANESTHESIA (OUTPATIENT)
Dept: GASTROENTEROLOGY | Facility: HOSPITAL | Age: 43
End: 2023-09-20
Payer: COMMERCIAL

## 2023-09-20 VITALS
TEMPERATURE: 97.1 F | BODY MASS INDEX: 40.42 KG/M2 | WEIGHT: 315 LBS | SYSTOLIC BLOOD PRESSURE: 122 MMHG | RESPIRATION RATE: 11 BRPM | OXYGEN SATURATION: 95 % | HEART RATE: 66 BPM | DIASTOLIC BLOOD PRESSURE: 90 MMHG

## 2023-09-20 DIAGNOSIS — K62.5 RECTAL BLEEDING: ICD-10-CM

## 2023-09-20 DIAGNOSIS — Z79.4 TYPE 2 DIABETES MELLITUS WITH HYPERGLYCEMIA, WITH LONG-TERM CURRENT USE OF INSULIN: ICD-10-CM

## 2023-09-20 DIAGNOSIS — T78.40XD ALLERGY, SUBSEQUENT ENCOUNTER: ICD-10-CM

## 2023-09-20 DIAGNOSIS — I10 PRIMARY HYPERTENSION: ICD-10-CM

## 2023-09-20 DIAGNOSIS — Z87.19 HISTORY OF COLITIS: ICD-10-CM

## 2023-09-20 DIAGNOSIS — N18.9 CHRONIC KIDNEY DISEASE, UNSPECIFIED CKD STAGE: ICD-10-CM

## 2023-09-20 DIAGNOSIS — E11.65 TYPE 2 DIABETES MELLITUS WITH HYPERGLYCEMIA, WITH LONG-TERM CURRENT USE OF INSULIN: ICD-10-CM

## 2023-09-20 DIAGNOSIS — R10.9 ABDOMINAL CRAMPING: ICD-10-CM

## 2023-09-20 LAB — GLUCOSE BLDC GLUCOMTR-MCNC: 132 MG/DL (ref 70–99)

## 2023-09-20 PROCEDURE — 45380 COLONOSCOPY AND BIOPSY: CPT | Performed by: INTERNAL MEDICINE

## 2023-09-20 PROCEDURE — 88305 TISSUE EXAM BY PATHOLOGIST: CPT | Performed by: INTERNAL MEDICINE

## 2023-09-20 PROCEDURE — 25010000002 PROPOFOL 10 MG/ML EMULSION: Performed by: NURSE ANESTHETIST, CERTIFIED REGISTERED

## 2023-09-20 PROCEDURE — 45385 COLONOSCOPY W/LESION REMOVAL: CPT | Performed by: INTERNAL MEDICINE

## 2023-09-20 PROCEDURE — 82948 REAGENT STRIP/BLOOD GLUCOSE: CPT

## 2023-09-20 RX ORDER — FEXOFENADINE HCL 180 MG/1
180 TABLET ORAL DAILY
COMMUNITY

## 2023-09-20 RX ORDER — SODIUM CHLORIDE, SODIUM LACTATE, POTASSIUM CHLORIDE, CALCIUM CHLORIDE 600; 310; 30; 20 MG/100ML; MG/100ML; MG/100ML; MG/100ML
30 INJECTION, SOLUTION INTRAVENOUS CONTINUOUS
Status: DISCONTINUED | OUTPATIENT
Start: 2023-09-20 | End: 2023-09-20 | Stop reason: HOSPADM

## 2023-09-20 RX ORDER — LEVOTHYROXINE SODIUM 0.05 MG/1
50 TABLET ORAL EVERY MORNING
Qty: 90 TABLET | Refills: 1 | Status: SHIPPED | OUTPATIENT
Start: 2023-09-20

## 2023-09-20 RX ORDER — DAPAGLIFLOZIN 10 MG/1
1 TABLET, FILM COATED ORAL EVERY MORNING
Qty: 90 TABLET | Refills: 1 | Status: SHIPPED | OUTPATIENT
Start: 2023-09-20

## 2023-09-20 RX ORDER — MONTELUKAST SODIUM 10 MG/1
10 TABLET ORAL NIGHTLY
Qty: 90 TABLET | Refills: 1 | Status: SHIPPED | OUTPATIENT
Start: 2023-09-20

## 2023-09-20 RX ORDER — LIDOCAINE HYDROCHLORIDE 20 MG/ML
INJECTION, SOLUTION EPIDURAL; INFILTRATION; INTRACAUDAL; PERINEURAL AS NEEDED
Status: DISCONTINUED | OUTPATIENT
Start: 2023-09-20 | End: 2023-09-20 | Stop reason: SURG

## 2023-09-20 RX ORDER — PROPOFOL 10 MG/ML
VIAL (ML) INTRAVENOUS AS NEEDED
Status: DISCONTINUED | OUTPATIENT
Start: 2023-09-20 | End: 2023-09-20 | Stop reason: SURG

## 2023-09-20 RX ORDER — LISINOPRIL 2.5 MG/1
2.5 TABLET ORAL DAILY
Qty: 90 TABLET | Refills: 1 | Status: SHIPPED | OUTPATIENT
Start: 2023-09-20

## 2023-09-20 RX ADMIN — PROPOFOL 50 MG: 10 INJECTION, EMULSION INTRAVENOUS at 10:52

## 2023-09-20 RX ADMIN — SODIUM CHLORIDE, POTASSIUM CHLORIDE, SODIUM LACTATE AND CALCIUM CHLORIDE 30 ML/HR: 600; 310; 30; 20 INJECTION, SOLUTION INTRAVENOUS at 10:46

## 2023-09-20 RX ADMIN — LIDOCAINE HYDROCHLORIDE 100 MG: 20 INJECTION, SOLUTION EPIDURAL; INFILTRATION; INTRACAUDAL; PERINEURAL at 10:52

## 2023-09-20 RX ADMIN — PROPOFOL 250 MCG/KG/MIN: 10 INJECTION, EMULSION INTRAVENOUS at 10:52

## 2023-09-20 NOTE — H&P
Pre Procedure History & Physical    Chief Complaint:   Rectal bleeding, generalized abd pain    Subjective     HPI:   42 yo M here for eval of rectal bleeding, generalized abd pain.    Past Medical History:   Past Medical History:   Diagnosis Date    Fatty liver     Hypogonadism in male     Hypothyroidism     Plantar fasciitis     Sleep apnea 06/10/2018    Vitamin D deficiency        Past Surgical History:  Past Surgical History:   Procedure Laterality Date    COLONOSCOPY  2007    [place]    CYST REMOVAL         Family History:  Family History   Problem Relation Age of Onset    Diabetes Maternal Grandmother     Breast cancer Paternal Grandmother     Colon cancer Neg Hx        Social History:   reports that he has never smoked. He has never used smokeless tobacco. He reports that he does not drink alcohol and does not use drugs.    Medications:   Medications Prior to Admission   Medication Sig Dispense Refill Last Dose    amLODIPine (NORVASC) 10 MG tablet Take 1 tablet by mouth Daily.       aspirin 81 MG chewable tablet aspirin 81 mg oral tablet,chewable chew 1 tablet (81 mg) by oral route once daily   Active       cetirizine (zyrTEC) 10 MG tablet        Continuous Blood Gluc  (Dexcom G6 ) device AS DIRECTED 1 each 0     Continuous Blood Gluc Sensor (Dexcom G6 Sensor) CHANGE EVERY 10 DAYS 3 each 2     Continuous Blood Gluc Transmit (Dexcom G6 Transmitter) misc 1 Device Every 3 (Three) Months. 1 each 3     Farxiga 10 MG tablet TAKE 1 TABLET BY MOUTH IN THE MORNING 90 tablet 1     glucose blood test strip Use as instructed 100 each 2     glucose monitor monitoring kit 1 each Daily. 1 each 0     Lancets 28G misc 1 Units Daily. 100 each 1     levothyroxine (SYNTHROID, LEVOTHROID) 50 MCG tablet TAKE 1 TABLET BY MOUTH ONCE DAILY IN THE MORNING 90 tablet 1     lisinopril (Zestril) 2.5 MG tablet Take 1 tablet by mouth Daily. 90 tablet 1     montelukast (SINGULAIR) 10 MG tablet TAKE 1 TABLET BY MOUTH ONCE  DAILY AT NIGHT 90 tablet 0     PEG-KCl-NaCl-NaSulf-Na Asc-C (MoviPrep) 100 g reconstituted solution powder Take 1,000 mL by mouth Every 12 (Twelve) Hours. 1000 mL 0        Allergies:  Patient has no known allergies.    ROS:    Pertinent items are noted in HPI     Objective     Blood pressure 121/66, pulse 56, temperature 97.1 °F (36.2 °C), temperature source Temporal, resp. rate 18, weight (!) 147 kg (323 lb 6.6 oz), SpO2 99 %.    Physical Exam   Constitutional: Pt is oriented to person, place, and time and well-developed, well-nourished, and in no distress.   Mouth/Throat: Oropharynx is clear and moist.   Neck: Normal range of motion.   Cardiovascular: Normal rate, regular rhythm and normal heart sounds.    Pulmonary/Chest: Effort normal and breath sounds normal.   Abdominal: Soft. Nontender  Skin: Skin is warm and dry.   Psychiatric: Mood, memory, affect and judgment normal.     Assessment & Plan     Diagnosis:  Rectal bleeding, generalized abd pain    Anticipated Surgical Procedure:  Colonoscopy    The risks, benefits, and alternatives of this procedure have been discussed with the patient or the responsible party- the patient understands and agrees to proceed.

## 2023-09-20 NOTE — ANESTHESIA POSTPROCEDURE EVALUATION
Patient: Sean Gore    Procedure Summary       Date: 09/20/23 Room / Location: [facility] ENDOSCOPY 4 / [facility] ENDOSCOPY    Anesthesia Start: 1049 Anesthesia Stop: 1130    Procedure: COLONOSCOPY WITH BIOPSIES AND POLYPECTOMY Diagnosis:       Rectal bleeding      Abdominal cramping      History of colitis      (Rectal bleeding [K62.5])      (Abdominal cramping [R10.9])      (History of colitis [Z87.19])    Surgeons: Rosemarie Grant MD Provider: Darvin Torres CRNA    Anesthesia Type: general ASA Status: 3            Anesthesia Type: general    Vitals  Vitals Value Taken Time   /90 09/20/23 1146   Temp 36.2 °C (97.1 °F) 09/20/23 1143   Pulse 64 09/20/23 1148   Resp 11 09/20/23 1143   SpO2 99 % 09/20/23 1148   Vitals shown include unvalidated device data.        Post Anesthesia Care and Evaluation    Post-procedure mental status: acceptable.  Pain management: satisfactory to patient    Airway patency: patent  Anesthetic complications: No anesthetic complications    Cardiovascular status: acceptable  Respiratory status: acceptable    Comments: Per chart review

## 2023-09-20 NOTE — TELEPHONE ENCOUNTER
From: Sean Gore  To: Rosanna Pritchard  Sent: 9/20/2023 1:47 PM EDT  Subject: Refills.    All my refills were denied because it said I needed a visit. I set one up for oct 2nd. Can i get my refills now? Or at least my farxiga? I only have 2 pills left.

## 2023-09-20 NOTE — ANESTHESIA PREPROCEDURE EVALUATION
Anesthesia Evaluation     Patient summary reviewed and Nursing notes reviewed   NPO Solid Status: > 8 hours  NPO Liquid Status: > 2 hours           Airway   Mallampati: II  TM distance: >3 FB  Neck ROM: full  No difficulty expected  Dental          Pulmonary - normal exam   (+) ,sleep apnea on CPAP  Cardiovascular - normal exam  Exercise tolerance: excellent (>7 METS)    (+) hypertension well controlled 2 medications or greater      Neuro/Psych- negative ROS  GI/Hepatic/Renal/Endo    (+) morbid obesity, liver disease fatty liver disease, diabetes mellitus type 2, thyroid problem hypothyroidism  (-)  obesity, GI bleed    Musculoskeletal     Abdominal   (+) obese   Substance History - negative use     OB/GYN          Other - negative ROS                       Anesthesia Plan    ASA 3     general   total IV anesthesia  intravenous induction     Anesthetic plan, risks, benefits, and alternatives have been provided, discussed and informed consent has been obtained with: patient.    Plan discussed with CRNA.      CODE STATUS:

## 2023-09-21 DIAGNOSIS — K52.9 COLITIS: Primary | ICD-10-CM

## 2023-09-21 LAB
CYTO UR: NORMAL
LAB AP CASE REPORT: NORMAL
LAB AP CLINICAL INFORMATION: NORMAL
PATH REPORT.FINAL DX SPEC: NORMAL
PATH REPORT.GROSS SPEC: NORMAL

## 2023-09-21 RX ORDER — MESALAMINE 0.38 G/1
1500 CAPSULE, EXTENDED RELEASE ORAL DAILY
Qty: 360 CAPSULE | Refills: 1 | Status: SHIPPED | OUTPATIENT
Start: 2023-09-21 | End: 2023-12-20

## 2023-09-22 ENCOUNTER — TELEPHONE (OUTPATIENT)
Dept: GASTROENTEROLOGY | Facility: CLINIC | Age: 43
End: 2023-09-22
Payer: COMMERCIAL

## 2023-09-22 NOTE — TELEPHONE ENCOUNTER
Attempted to contact patient, left voicemail message to return call.     5 year colon recall placed.

## 2023-09-25 ENCOUNTER — PROCEDURE VISIT (OUTPATIENT)
Dept: UROLOGY | Facility: CLINIC | Age: 43
End: 2023-09-25

## 2023-09-25 ENCOUNTER — TELEPHONE (OUTPATIENT)
Dept: GASTROENTEROLOGY | Facility: CLINIC | Age: 43
End: 2023-09-25

## 2023-09-25 DIAGNOSIS — K52.9 COLITIS: ICD-10-CM

## 2023-09-25 DIAGNOSIS — Z30.2 STERILIZATION: Primary | ICD-10-CM

## 2023-09-25 PROCEDURE — 55250 REMOVAL OF SPERM DUCT(S): CPT | Performed by: UROLOGY

## 2023-09-25 NOTE — PROGRESS NOTES
Patient was placed in lithotomy position.  Thorough scrubbing her lower abdomen and external genitalia was performed.  Patient was draped in a sterile fashion.  We used 1% Xylocaine with epinephrine and 0.2% ropivacaine equal amounts and injected in the midline of scrotum.  Chinese technique was used and a nick was made in the mid scrotum with the forceps.  Right vas was grasped with a clamp and using cautery it was isolated from the blood vessels and about 2.5 cm of the vas was removed and the edges were coagulated.    Same thing was done on the left side.  Hemostasis was acquired.  4 x 4 dressing was given on the incision and patient was sent home in good condition.  We used about 30 mL of local anesthesia.  Blood loss was less than 1 mL

## 2023-09-26 NOTE — TELEPHONE ENCOUNTER
Patient received the message about this medicine not needing a PA, however they have passport as a secondary insurance and that is what is needing the PA, she wants to see if they will pay the $350 that is required of patient.

## 2023-09-26 NOTE — TELEPHONE ENCOUNTER
Attempted to submit PA with Passport, Received response that the RX is available without PA. $350.00 may be due to copayment or Deductible.         Left voicemail for pt advising

## 2023-09-28 RX ORDER — MESALAMINE 0.38 G/1
1500 CAPSULE, EXTENDED RELEASE ORAL DAILY
Qty: 360 CAPSULE | Refills: 1 | Status: SHIPPED | OUTPATIENT
Start: 2023-09-28 | End: 2023-12-27

## 2023-09-28 NOTE — TELEPHONE ENCOUNTER
Pt is agreeable to use [facility] [facility] please send RX. I have set up the pts acct.

## 2023-09-29 NOTE — TELEPHONE ENCOUNTER
[facility] does not accept [place] insurance medication will be $123.80 with them due not being able to apply insurance. Please advise

## 2023-09-29 NOTE — TELEPHONE ENCOUNTER
The previous messaged indicated that he had passport.  I'm assuming he has both?  Is there a different form of mesalamine that's in a lower teir on their plan?

## 2023-09-29 NOTE — TELEPHONE ENCOUNTER
Okay, so whichever insurance he uses as primary for medication coverage, I need to know which formulation of mesalamine is cheapest for him (in the lowest tier).

## 2023-10-02 ENCOUNTER — LAB (OUTPATIENT)
Dept: LAB | Facility: HOSPITAL | Age: 43
End: 2023-10-02
Payer: COMMERCIAL

## 2023-10-02 ENCOUNTER — OFFICE VISIT (OUTPATIENT)
Dept: FAMILY MEDICINE CLINIC | Facility: CLINIC | Age: 43
End: 2023-10-02
Payer: COMMERCIAL

## 2023-10-02 VITALS
DIASTOLIC BLOOD PRESSURE: 79 MMHG | WEIGHT: 315 LBS | SYSTOLIC BLOOD PRESSURE: 127 MMHG | TEMPERATURE: 98 F | BODY MASS INDEX: 40.97 KG/M2 | HEART RATE: 66 BPM | OXYGEN SATURATION: 98 %

## 2023-10-02 DIAGNOSIS — Z79.899 LONG-TERM USE OF HIGH-RISK MEDICATION: ICD-10-CM

## 2023-10-02 DIAGNOSIS — E03.9 HYPOTHYROIDISM, UNSPECIFIED TYPE: ICD-10-CM

## 2023-10-02 DIAGNOSIS — K52.9 COLITIS: ICD-10-CM

## 2023-10-02 DIAGNOSIS — G90.A POSTURAL ORTHOSTATIC TACHYCARDIA SYNDROME (POTS): ICD-10-CM

## 2023-10-02 DIAGNOSIS — E55.9 VITAMIN D DEFICIENCY: ICD-10-CM

## 2023-10-02 DIAGNOSIS — E11.65 TYPE 2 DIABETES MELLITUS WITH HYPERGLYCEMIA, WITH LONG-TERM CURRENT USE OF INSULIN: Primary | ICD-10-CM

## 2023-10-02 DIAGNOSIS — E29.1 HYPOGONADISM IN MALE: ICD-10-CM

## 2023-10-02 DIAGNOSIS — I10 PRIMARY HYPERTENSION: ICD-10-CM

## 2023-10-02 DIAGNOSIS — Z79.4 TYPE 2 DIABETES MELLITUS WITH HYPERGLYCEMIA, WITH LONG-TERM CURRENT USE OF INSULIN: Primary | ICD-10-CM

## 2023-10-02 DIAGNOSIS — Z79.4 TYPE 2 DIABETES MELLITUS WITH HYPERGLYCEMIA, WITH LONG-TERM CURRENT USE OF INSULIN: ICD-10-CM

## 2023-10-02 DIAGNOSIS — E11.65 TYPE 2 DIABETES MELLITUS WITH HYPERGLYCEMIA, WITH LONG-TERM CURRENT USE OF INSULIN: ICD-10-CM

## 2023-10-02 LAB
ALBUMIN SERPL-MCNC: 4.3 G/DL (ref 3.5–5.2)
ALBUMIN UR-MCNC: <1.2 MG/DL
ALBUMIN/GLOB SERPL: 1.8 G/DL
ALP SERPL-CCNC: 105 U/L (ref 39–117)
ALT SERPL W P-5'-P-CCNC: 59 U/L (ref 1–41)
ANION GAP SERPL CALCULATED.3IONS-SCNC: 9 MMOL/L (ref 5–15)
AST SERPL-CCNC: 39 U/L (ref 1–40)
BILIRUB SERPL-MCNC: 0.3 MG/DL (ref 0–1.2)
BUN SERPL-MCNC: 12 MG/DL (ref 6–20)
BUN/CREAT SERPL: 11.7 (ref 7–25)
CALCIUM SPEC-SCNC: 9.4 MG/DL (ref 8.6–10.5)
CHLORIDE SERPL-SCNC: 105 MMOL/L (ref 98–107)
CHOLEST SERPL-MCNC: 193 MG/DL (ref 0–200)
CO2 SERPL-SCNC: 26 MMOL/L (ref 22–29)
CREAT SERPL-MCNC: 1.03 MG/DL (ref 0.76–1.27)
EGFRCR SERPLBLD CKD-EPI 2021: 91.9 ML/MIN/1.73
GLOBULIN UR ELPH-MCNC: 2.4 GM/DL
GLUCOSE SERPL-MCNC: 119 MG/DL (ref 65–99)
HBA1C MFR BLD: 7.1 % (ref 4.8–5.6)
HDLC SERPL-MCNC: 30 MG/DL (ref 40–60)
LDLC SERPL CALC-MCNC: 108 MG/DL (ref 0–100)
LDLC/HDLC SERPL: 3.32 {RATIO}
POTASSIUM SERPL-SCNC: 4.3 MMOL/L (ref 3.5–5.2)
PROT SERPL-MCNC: 6.7 G/DL (ref 6–8.5)
PSA SERPL-MCNC: 0.52 NG/ML (ref 0–4)
SODIUM SERPL-SCNC: 140 MMOL/L (ref 136–145)
T4 FREE SERPL-MCNC: 1.17 NG/DL (ref 0.93–1.7)
TRIGL SERPL-MCNC: 317 MG/DL (ref 0–150)
TSH SERPL DL<=0.05 MIU/L-ACNC: 4.22 UIU/ML (ref 0.27–4.2)
VLDLC SERPL-MCNC: 55 MG/DL (ref 5–40)

## 2023-10-02 PROCEDURE — 83520 IMMUNOASSAY QUANT NOS NONAB: CPT

## 2023-10-02 PROCEDURE — 80061 LIPID PANEL: CPT | Performed by: NURSE PRACTITIONER

## 2023-10-02 PROCEDURE — 82397 CHEMILUMINESCENT ASSAY: CPT

## 2023-10-02 PROCEDURE — 81479 UNLISTED MOLECULAR PATHOLOGY: CPT

## 2023-10-02 PROCEDURE — 81405 MOPATH PROCEDURE LEVEL 6: CPT

## 2023-10-02 PROCEDURE — 83036 HEMOGLOBIN GLYCOSYLATED A1C: CPT | Performed by: NURSE PRACTITIONER

## 2023-10-02 PROCEDURE — 84153 ASSAY OF PSA TOTAL: CPT | Performed by: NURSE PRACTITIONER

## 2023-10-02 PROCEDURE — 88350 IMFLUOR EA ADDL 1ANTB STN PX: CPT

## 2023-10-02 PROCEDURE — 84403 ASSAY OF TOTAL TESTOSTERONE: CPT | Performed by: NURSE PRACTITIONER

## 2023-10-02 PROCEDURE — 84439 ASSAY OF FREE THYROXINE: CPT | Performed by: NURSE PRACTITIONER

## 2023-10-02 PROCEDURE — 84402 ASSAY OF FREE TESTOSTERONE: CPT | Performed by: NURSE PRACTITIONER

## 2023-10-02 PROCEDURE — 84443 ASSAY THYROID STIM HORMONE: CPT

## 2023-10-02 PROCEDURE — 88346 IMFLUOR 1ST 1ANTB STAIN PX: CPT

## 2023-10-02 PROCEDURE — 86140 C-REACTIVE PROTEIN: CPT

## 2023-10-02 PROCEDURE — 36415 COLL VENOUS BLD VENIPUNCTURE: CPT | Performed by: NURSE PRACTITIONER

## 2023-10-02 PROCEDURE — 82043 UR ALBUMIN QUANTITATIVE: CPT

## 2023-10-02 PROCEDURE — 80053 COMPREHEN METABOLIC PANEL: CPT | Performed by: NURSE PRACTITIONER

## 2023-10-02 RX ORDER — BISOPROLOL FUMARATE 5 MG/1
5 TABLET, FILM COATED ORAL NIGHTLY
Qty: 90 TABLET | Refills: 1 | Status: SHIPPED | OUTPATIENT
Start: 2023-10-02

## 2023-10-02 RX ORDER — AMLODIPINE BESYLATE 10 MG/1
10 TABLET ORAL DAILY
Qty: 90 TABLET | Refills: 1 | Status: SHIPPED | OUTPATIENT
Start: 2023-10-02

## 2023-10-02 NOTE — PATIENT INSTRUCTIONS
Diabetes Mellitus and Nutrition, Adult  When you have diabetes, or diabetes mellitus, it is very important to have healthy eating habits because your blood sugar (glucose) levels are greatly affected by what you eat and drink. Eating healthy foods in the right amounts, at about the same times every day, can help you:  Manage your blood glucose.  Lower your risk of heart disease.  Improve your blood pressure.  Reach or maintain a healthy weight.  What can affect my meal plan?  Every person with diabetes is different, and each person has different needs for a meal plan. Your health care provider may recommend that you work with a dietitian to make a meal plan that is best for you. Your meal plan may vary depending on factors such as:  The calories you need.  The medicines you take.  Your weight.  Your blood glucose, blood pressure, and cholesterol levels.  Your activity level.  Other health conditions you have, such as heart or kidney disease.  How do carbohydrates affect me?  Carbohydrates, also called carbs, affect your blood glucose level more than any other type of food. Eating carbs raises the amount of glucose in your blood.  It is important to know how many carbs you can safely have in each meal. This is different for every person. Your dietitian can help you calculate how many carbs you should have at each meal and for each snack.  How does alcohol affect me?  Alcohol can cause a decrease in blood glucose (hypoglycemia), especially if you use insulin or take certain diabetes medicines by mouth. Hypoglycemia can be a life-threatening condition. Symptoms of hypoglycemia, such as sleepiness, dizziness, and confusion, are similar to symptoms of having too much alcohol.  Do not drink alcohol if:  Your health care provider tells you not to drink.  You are pregnant, may be pregnant, or are planning to become pregnant.  If you drink alcohol:  Limit how much you have to:  0-1 drink a day for women.  0-2 drinks a day  "for men.  Know how much alcohol is in your drink. In the [place] one drink equals one 12 oz bottle of beer (355 mL), one 5 oz glass of wine (148 mL), or one 1½ oz glass of hard liquor (44 mL).  Keep yourself hydrated with water, diet soda, or unsweetened iced tea. Keep in mind that regular soda, juice, and other mixers may contain a lot of sugar and must be counted as carbs.  What are tips for following this plan?    Reading food labels  Start by checking the serving size on the Nutrition Facts label of packaged foods and drinks. The number of calories and the amount of carbs, fats, and other nutrients listed on the label are based on one serving of the item. Many items contain more than one serving per package.  Check the total grams (g) of carbs in one serving.  Check the number of grams of saturated fats and trans fats in one serving. Choose foods that have a low amount or none of these fats.  Check the number of milligrams (mg) of salt (sodium) in one serving. Most people should limit total sodium intake to less than 2,300 mg per day.  Always check the nutrition information of foods labeled as \"low-fat\" or \"nonfat.\" These foods may be higher in added sugar or refined carbs and should be avoided.  Talk to your dietitian to identify your daily goals for nutrients listed on the label.  Shopping  Avoid buying canned, pre-made, or processed foods. These foods tend to be high in fat, sodium, and added sugar.  Shop around the outside edge of the grocery store. This is where you will most often find fresh fruits and vegetables, bulk grains, fresh meats, and fresh dairy products.  Cooking  Use low-heat cooking methods, such as baking, instead of high-heat cooking methods, such as deep frying.  Cook using healthy oils, such as olive, canola, or sunflower oil.  Avoid cooking with butter, cream, or high-fat meats.  Meal planning  Eat meals and snacks regularly, preferably at the same times every day. Avoid going long periods " of time without eating.  Eat foods that are high in fiber, such as fresh fruits, vegetables, beans, and whole grains.  Eat 4-6 oz (112-168 g) of lean protein each day, such as lean meat, chicken, fish, eggs, or tofu. One ounce (oz) (28 g) of lean protein is equal to:  1 oz (28 g) of meat, chicken, or fish.  1 egg.  ¼ cup (62 g) of tofu.  Eat some foods each day that contain healthy fats, such as avocado, nuts, seeds, and fish.  What foods should I eat?  Fruits  Berries. Apples. Oranges. Peaches. Apricots. Plums. Grapes. Mangoes. Papayas. Pomegranates. Kiwi. Cherries.  Vegetables  Leafy greens, including lettuce, spinach, kale, chard, minerva greens, mustard greens, and cabbage. Beets. Cauliflower. Broccoli. Carrots. Green beans. Tomatoes. Peppers. Onions. Cucumbers. [place] sprouts.  Grains  Whole grains, such as whole-wheat or whole-grain bread, crackers, tortillas, cereal, and pasta. Unsweetened oatmeal. Quinoa. Brown or wild rice.  Meats and other proteins  Seafood. Poultry without skin. Lean cuts of poultry and beef. Tofu. Nuts. Seeds.  Dairy  Low-fat or fat-free dairy products such as milk, yogurt, and cheese.  The items listed above may not be a complete list of foods and beverages you can eat and drink. Contact a dietitian for more information.  What foods should I avoid?  Fruits  Fruits canned with syrup.  Vegetables  Canned vegetables. Frozen vegetables with butter or cream sauce.  Grains  Refined white flour and flour products such as bread, pasta, snack foods, and cereals. Avoid all processed foods.  Meats and other proteins  Fatty cuts of meat. Poultry with skin. Breaded or fried meats. Processed meat. Avoid saturated fats.  Dairy  Full-fat yogurt, cheese, or milk.  Beverages  Sweetened drinks, such as soda or iced tea.  The items listed above may not be a complete list of foods and beverages you should avoid. Contact a dietitian for more information.  Questions to ask a health care provider  Do I need  to meet with a certified diabetes care and ?  Do I need to meet with a dietitian?  What number can I call if I have questions?  When are the best times to check my blood glucose?  Where to find more information:  American Diabetes Association: diabetes.org  Academy of Nutrition and Dietetics: eatright.org  National [place] of Diabetes and Digestive and Kidney Diseases: niddk.nih.gov  Association of Diabetes Care & Education Specialists: diabeteseducator.org  Summary  It is important to have healthy eating habits because your blood sugar (glucose) levels are greatly affected by what you eat and drink. It is important to use alcohol carefully.  A healthy meal plan will help you manage your blood glucose and lower your risk of heart disease.  Your health care provider may recommend that you work with a dietitian to make a meal plan that is best for you.  This information is not intended to replace advice given to you by your health care provider. Make sure you discuss any questions you have with your health care provider.  Document Revised: 07/21/2021 Document Reviewed: 07/21/2021  Elsevier Patient Education © 2023 [employer] Inc.

## 2023-10-05 DIAGNOSIS — E03.9 HYPOTHYROIDISM, UNSPECIFIED TYPE: Primary | ICD-10-CM

## 2023-10-05 RX ORDER — LEVOTHYROXINE SODIUM 0.07 MG/1
75 TABLET ORAL EVERY MORNING
Qty: 60 TABLET | Refills: 0 | Status: SHIPPED | OUTPATIENT
Start: 2023-10-05

## 2023-10-07 LAB — REF LAB TEST METHOD: NORMAL

## 2023-10-08 LAB
TESTOST FREE SERPL-MCNC: 9.9 PG/ML (ref 6.8–21.5)
TESTOST SERPL-MCNC: 276 NG/DL (ref 264–916)

## 2023-10-09 ENCOUNTER — TELEPHONE (OUTPATIENT)
Dept: GASTROENTEROLOGY | Facility: CLINIC | Age: 43
End: 2023-10-09
Payer: COMMERCIAL

## 2023-10-09 NOTE — TELEPHONE ENCOUNTER
----- Message from HELENA Holloway sent at 10/9/2023  9:00 AM EDT -----  IBD serology negative for UC and crohns.

## 2023-10-30 RX ORDER — PROCHLORPERAZINE 25 MG/1
SUPPOSITORY RECTAL
Qty: 3 EACH | Refills: 0 | Status: SHIPPED | OUTPATIENT
Start: 2023-10-30

## 2023-11-27 RX ORDER — LEVOTHYROXINE SODIUM 0.07 MG/1
75 TABLET ORAL EVERY MORNING
Qty: 60 TABLET | Refills: 0 | OUTPATIENT
Start: 2023-11-27

## 2023-12-26 DIAGNOSIS — Z79.4 TYPE 2 DIABETES MELLITUS WITH HYPERGLYCEMIA, WITH LONG-TERM CURRENT USE OF INSULIN: ICD-10-CM

## 2023-12-26 DIAGNOSIS — I10 PRIMARY HYPERTENSION: ICD-10-CM

## 2023-12-26 DIAGNOSIS — E11.65 TYPE 2 DIABETES MELLITUS WITH HYPERGLYCEMIA, WITH LONG-TERM CURRENT USE OF INSULIN: ICD-10-CM

## 2023-12-26 DIAGNOSIS — T78.40XD ALLERGY, SUBSEQUENT ENCOUNTER: ICD-10-CM

## 2023-12-26 RX ORDER — MONTELUKAST SODIUM 10 MG/1
10 TABLET ORAL NIGHTLY
Qty: 90 TABLET | Refills: 0 | Status: SHIPPED | OUTPATIENT
Start: 2023-12-26

## 2023-12-26 RX ORDER — LISINOPRIL 2.5 MG/1
2.5 TABLET ORAL DAILY
Qty: 90 TABLET | Refills: 0 | Status: SHIPPED | OUTPATIENT
Start: 2023-12-26

## 2023-12-29 ENCOUNTER — LAB (OUTPATIENT)
Dept: LAB | Facility: HOSPITAL | Age: 43
End: 2023-12-29
Payer: COMMERCIAL

## 2023-12-29 DIAGNOSIS — E03.9 HYPOTHYROIDISM, UNSPECIFIED TYPE: ICD-10-CM

## 2023-12-29 LAB
25(OH)D3 SERPL-MCNC: 26.6 NG/ML (ref 30–100)
TSH SERPL DL<=0.05 MIU/L-ACNC: 1.74 UIU/ML (ref 0.27–4.2)

## 2023-12-29 PROCEDURE — 82306 VITAMIN D 25 HYDROXY: CPT | Performed by: NURSE PRACTITIONER

## 2023-12-29 PROCEDURE — 36415 COLL VENOUS BLD VENIPUNCTURE: CPT

## 2023-12-29 PROCEDURE — 84443 ASSAY THYROID STIM HORMONE: CPT

## 2024-01-02 RX ORDER — ERGOCALCIFEROL 1.25 MG/1
50000 CAPSULE ORAL WEEKLY
Qty: 13 CAPSULE | Refills: 0 | Status: SHIPPED | OUTPATIENT
Start: 2024-01-02

## 2024-01-09 NOTE — PROGRESS NOTES
Chief Complaint    Annual Exam  Diabetes (Follow up) and Annual Exam    Subjective          Sean Gore is a 43 y.o. male who presents to [facility]    Annual Exam    History of Present Illness  The patient presents for evaluation of multiple medical concerns. He is accompanied by an adult female.    The patient's last dental examination was conducted a significant time ago, and he is currently in the process of securing a dental appointment. His last ophthalmological examination was conducted within the past year.     He has been without Ozempic for an extended period, despite experiencing gastrointestinal discomfort. He is compliant with his other diabetic medications and does not perform daily foot checks.    The patient is compliant with his thyroid medication regimen, taken on an empty stomach each morning. He reports persistent fatigue, prompting a desire for his testosterone levels to be assessed.    He uses a CPAP every night and gets about 7 hours of sleep with it. He can not sleep without it. He has not had any near syncope episodes since 11/29/2023. He denies chest pain or shortness of breath.     His bowel and bladder are working okay.      PHQ-2 Total Score: 0   PHQ-9 Total Score: 0        Review of Systems   Constitutional:  Positive for fatigue. Negative for chills and fever.   Respiratory:  Negative for cough and shortness of breath.    Cardiovascular:  Negative for chest pain and palpitations.   Gastrointestinal:  Negative for constipation, diarrhea, nausea and vomiting.   Endocrine: Negative for cold intolerance and heat intolerance.   Genitourinary:  Negative for difficulty urinating and dysuria.   Musculoskeletal:  Negative for arthralgias, back pain, gait problem, joint swelling and neck pain.   Skin:  Negative for rash.   Neurological:  Negative for dizziness, seizures and headaches.   Hematological:  Negative for adenopathy. Does not bruise/bleed  easily.   Psychiatric/Behavioral:  Negative for confusion, dysphoric mood and sleep disturbance. The patient is not nervous/anxious.           Medical History: has a past medical history of Fatty liver, Hypogonadism in male, Hypothyroidism, Plantar fasciitis, Sleep apnea (06/10/2018), and Vitamin D deficiency.     Surgical History: has a past surgical history that includes Cyst Removal; Colonoscopy (2007); and Colonoscopy (N/A, 9/20/2023).     Family History: family history includes Breast cancer in his paternal grandmother; Diabetes in his maternal grandmother.     Social History: reports that he has never smoked. He has never used smokeless tobacco. He reports that he does not drink alcohol and does not use drugs.    Allergies: Metformin      Health Maintenance Due   Topic Date Due    DIABETIC EYE EXAM  Never done    Hepatitis B (1 of 3 - 19+ 3-dose series) Never done    TDAP/TD VACCINES (2 - Tdap) 01/19/2015    DIABETIC FOOT EXAM  Never done    COVID-19 Vaccine (1 - 2023-24 season) Never done            Current Outpatient Medications:     amLODIPine (NORVASC) 10 MG tablet, Take 1 tablet by mouth Daily., Disp: 90 tablet, Rfl: 0    aspirin 81 MG chewable tablet, aspirin 81 mg oral tablet,chewable chew 1 tablet (81 mg) by oral route once daily   Active, Disp: , Rfl:     bisoprolol (ZEBeta) 5 MG tablet, Take 1 tablet by mouth Every Night., Disp: 90 tablet, Rfl: 0    cetirizine (zyrTEC) 10 MG tablet, Take 1 tablet by mouth Daily., Disp: , Rfl:     Continuous Blood Gluc  (Dexcom G6 ) device, AS DIRECTED, Disp: 1 each, Rfl: 0    Continuous Blood Gluc Sensor (Dexcom G6 Sensor), CHANGE EVERY 10 DAYS, Disp: 3 each, Rfl: 2    dapagliflozin Propanediol 10 MG tablet, Take 10 mg by mouth Every Morning., Disp: 90 tablet, Rfl: 0    glucose blood test strip, Use as instructed, Disp: 100 each, Rfl: 2    glucose monitor monitoring kit, 1 each Daily., Disp: 1 each, Rfl: 0    Lancets 28G misc, 1 Units Daily., Disp:  "100 each, Rfl: 1    levothyroxine (SYNTHROID, LEVOTHROID) 75 MCG tablet, Take 1 tablet by mouth Every Morning., Disp: 90 tablet, Rfl: 0    lisinopril (PRINIVIL,ZESTRIL) 2.5 MG tablet, Take 1 tablet by mouth Daily., Disp: 90 tablet, Rfl: 0    montelukast (SINGULAIR) 10 MG tablet, Take 1 tablet by mouth Every Night., Disp: 90 tablet, Rfl: 0    Semaglutide,0.25 or 0.5MG/DOS, (Ozempic, 0.25 or 0.5 MG/DOSE,) 2 MG/3ML solution pen-injector, Inject 0.5 mg under the skin into the appropriate area as directed 1 (One) Time Per Week., Disp: 3 mL, Rfl: 1      Immunization History   Administered Date(s) Administered    Td (TDVAX) 01/19/2005         Objective       Vitals:    04/12/24 0919   BP: 122/81   Pulse: 52   Temp: 97.5 °F (36.4 °C)   TempSrc: Temporal   SpO2: 97%   Weight: (!) 150 kg (331 lb)   Height: 190.5 cm (75\")      Body mass index is 41.37 kg/m².   Wt Readings from Last 3 Encounters:   04/12/24 (!) 150 kg (331 lb)   10/02/23 (!) 149 kg (327 lb 12.8 oz)   09/20/23 (!) 147 kg (323 lb 6.6 oz)      BP Readings from Last 3 Encounters:   04/12/24 122/81   10/02/23 127/79   09/20/23 122/90                 Physical Exam  Constitutional:       General: He is not in acute distress.     Appearance: Normal appearance.   Eyes:      General: Lids are normal. No scleral icterus.     Conjunctiva/sclera: Conjunctivae normal.      Pupils: Pupils are equal, round, and reactive to light.   Neck:      Thyroid: No thyroid mass, thyromegaly or thyroid tenderness.      Vascular: No carotid bruit.   Cardiovascular:      Rate and Rhythm: Normal rate.      Pulses: Normal pulses.           Dorsalis pedis pulses are 2+ on the right side and 2+ on the left side.      Heart sounds: Normal heart sounds. No murmur heard.     No friction rub. No gallop.   Pulmonary:      Effort: Pulmonary effort is normal. No retractions.      Breath sounds: Normal breath sounds.   Abdominal:      General: Abdomen is flat. Bowel sounds are normal. There is no " distension.      Palpations: Abdomen is soft.      Tenderness: There is no abdominal tenderness. There is no guarding.   Musculoskeletal:      Cervical back: Full passive range of motion without pain, normal range of motion and neck supple. No rigidity or tenderness.      Right lower leg: No edema.      Left lower leg: No edema.   Feet:      Right foot:      Protective Sensation: 3 sites tested.  3 sites sensed.      Skin integrity: Skin integrity normal. No ulcer or blister.      Toenail Condition: Right toenails are normal.      Left foot:      Protective Sensation: 3 sites tested.  3 sites sensed.      Skin integrity: Skin integrity normal. No ulcer or blister.      Toenail Condition: Left toenails are normal.      Comments:      Lymphadenopathy:      Cervical: No cervical adenopathy.   Skin:     General: Skin is warm and dry.      Findings: No lesion or rash.      Nails: There is no clubbing.   Neurological:      General: No focal deficit present.      Mental Status: He is alert and oriented to person, place, and time.      Coordination: Coordination is intact.      Gait: Gait is intact.   Psychiatric:         Attention and Perception: Attention normal.         Mood and Affect: Mood and affect normal.         Speech: Speech normal.         Behavior: Behavior normal. Behavior is cooperative.         Thought Content: Thought content normal.         Cognition and Memory: Cognition normal.         Judgment: Judgment normal.       Physical Exam        Result Review :       Common labs          10/2/2023    09:41 4/12/2024    10:25   Common Labs   Glucose 119  120    BUN 12  13    Creatinine 1.03  1.21    Sodium 140  141    Potassium 4.3  4.7    Chloride 105  105    Calcium 9.4  9.4    Albumin 4.3  4.3    Total Bilirubin 0.3  0.5    Alkaline Phosphatase 105  85    AST (SGOT) 39  26    ALT (SGPT) 59  41    WBC  5.67    Hemoglobin  16.0    Hematocrit  50.7    Platelets  211    Total Cholesterol 193  189    Triglycerides  317  145    HDL Cholesterol 30  34    LDL Cholesterol  108  129    Hemoglobin A1C 7.10  6.40    Microalbumin, Urine <1.2     PSA 0.516  0.496      Results  Laboratory Studies  Hemoglobin A1c was 7.1.                 Assessment and Plan      Assessment & Plan  1. Health maintenance.  The patient's blood pressure is well-regulated. The patient was counseled on the importance of annual dental check-ups and an annual diabetic eye examination. A urine drug screen will be conducted in the office today, and his laboratory tests will be conducted at the lab center. The patient was advised to consider AndroGel cream as an alternative to injections. Prescriptions for his current medications were refilled.    2. Hypothyroidism.    Follow-up  The patient is scheduled for a follow-up visit in 6 months.    Diagnoses and all orders for this visit:    1. Annual physical exam (Primary)  -     CBC (No Diff)    2. Type 2 diabetes mellitus with hyperglycemia, with long-term current use of insulin  -     Hemoglobin A1c  -     Semaglutide,0.25 or 0.5MG/DOS, (Ozempic, 0.25 or 0.5 MG/DOSE,) 2 MG/3ML solution pen-injector; Inject 0.5 mg under the skin into the appropriate area as directed 1 (One) Time Per Week.  Dispense: 3 mL; Refill: 1  -     lisinopril (PRINIVIL,ZESTRIL) 2.5 MG tablet; Take 1 tablet by mouth Daily.  Dispense: 90 tablet; Refill: 0  -     dapagliflozin Propanediol 10 MG tablet; Take 10 mg by mouth Every Morning.  Dispense: 90 tablet; Refill: 0    3. Hypothyroidism, unspecified type  -     TSH  -     levothyroxine (SYNTHROID, LEVOTHROID) 75 MCG tablet; Take 1 tablet by mouth Every Morning.  Dispense: 90 tablet; Refill: 0    4. Screening for lipid disorders  -     Lipid Panel  -     Comprehensive Metabolic Panel    5. Allergy, subsequent encounter  -     montelukast (SINGULAIR) 10 MG tablet; Take 1 tablet by mouth Every Night.  Dispense: 90 tablet; Refill: 0    6. Primary hypertension  -     lisinopril (PRINIVIL,ZESTRIL) 2.5  MG tablet; Take 1 tablet by mouth Daily.  Dispense: 90 tablet; Refill: 0  -     amLODIPine (NORVASC) 10 MG tablet; Take 1 tablet by mouth Daily.  Dispense: 90 tablet; Refill: 0    7. Chronic kidney disease, unspecified CKD stage  Comments:  structural per CT.  Orders:  -     dapagliflozin Propanediol 10 MG tablet; Take 10 mg by mouth Every Morning.  Dispense: 90 tablet; Refill: 0    8. Encounter for medication monitoring  -     POC Medline 12 Panel Urine Drug Screen    9. Sleep apnea, unspecified type  Comments:  continue compliance with cpap nightly.    10. Hypogonadism in male  -     PSA DIAGNOSTIC  -     Testosterone, Free, Total    11. Postural orthostatic tachycardia syndrome (POTS)  -     bisoprolol (ZEBeta) 5 MG tablet; Take 1 tablet by mouth Every Night.  Dispense: 90 tablet; Refill: 0      Continue blood sugar monitoring daily and record.  Bring your log to office visits.  Call the office for readings below 70 and above 250 or any complications.    Daily foot care.  Avoid walking barefoot.    Annual dilated eye exam.    Discussed with patient blood pressure monitoring, hemoglobin A1c levels need to be below 7, and LDL goals below 70.    Follow Up     Return in about 6 months (around 10/12/2024) for Next scheduled follow up.    Updated annual wellness visit checklist.  Immunizations discussed.  Screening discussed and/or ordered.  Recommend yearly dental and eye exams. Also discussed monitoring of blood pressure and lipids.      Patient was given instructions and counseling regarding his condition or for health maintenance advice. Please see specific information pulled into the AVS if appropriate.     Patient or patient representative verbalized consent for the use of Ambient Listening during the visit with  HELENA Giles for chart documentation. 4/17/2024  09:49 EDT    HELENA Giles

## 2024-01-15 RX ORDER — LEVOTHYROXINE SODIUM 0.07 MG/1
75 TABLET ORAL EVERY MORNING
Qty: 60 TABLET | Refills: 0 | Status: SHIPPED | OUTPATIENT
Start: 2024-01-15

## 2024-01-22 RX ORDER — PROCHLORPERAZINE 25 MG/1
SUPPOSITORY RECTAL
Qty: 3 EACH | Refills: 0 | Status: SHIPPED | OUTPATIENT
Start: 2024-01-22

## 2024-02-22 RX ORDER — PROCHLORPERAZINE 25 MG/1
SUPPOSITORY RECTAL
Qty: 3 EACH | Refills: 2 | Status: SHIPPED | OUTPATIENT
Start: 2024-02-22

## 2024-02-27 DIAGNOSIS — G90.A POSTURAL ORTHOSTATIC TACHYCARDIA SYNDROME (POTS): ICD-10-CM

## 2024-02-27 DIAGNOSIS — Z79.4 TYPE 2 DIABETES MELLITUS WITH HYPERGLYCEMIA, WITH LONG-TERM CURRENT USE OF INSULIN: ICD-10-CM

## 2024-02-27 DIAGNOSIS — E11.65 TYPE 2 DIABETES MELLITUS WITH HYPERGLYCEMIA, WITH LONG-TERM CURRENT USE OF INSULIN: ICD-10-CM

## 2024-02-27 DIAGNOSIS — I10 PRIMARY HYPERTENSION: ICD-10-CM

## 2024-02-27 DIAGNOSIS — N18.9 CHRONIC KIDNEY DISEASE, UNSPECIFIED CKD STAGE: ICD-10-CM

## 2024-02-28 RX ORDER — AMLODIPINE BESYLATE 10 MG/1
10 TABLET ORAL DAILY
Qty: 90 TABLET | Refills: 0 | Status: SHIPPED | OUTPATIENT
Start: 2024-02-28

## 2024-02-28 RX ORDER — LEVOTHYROXINE SODIUM 0.07 MG/1
75 TABLET ORAL EVERY MORNING
Qty: 90 TABLET | Refills: 0 | Status: SHIPPED | OUTPATIENT
Start: 2024-02-28

## 2024-02-28 RX ORDER — BISOPROLOL FUMARATE 5 MG/1
5 TABLET, FILM COATED ORAL NIGHTLY
Qty: 90 TABLET | Refills: 0 | Status: SHIPPED | OUTPATIENT
Start: 2024-02-28

## 2024-02-29 RX ORDER — SEMAGLUTIDE 0.68 MG/ML
0.5 INJECTION, SOLUTION SUBCUTANEOUS WEEKLY
Qty: 3 ML | Refills: 1 | Status: SHIPPED | OUTPATIENT
Start: 2024-02-29

## 2024-04-12 ENCOUNTER — LAB (OUTPATIENT)
Dept: LAB | Facility: HOSPITAL | Age: 44
End: 2024-04-12
Payer: COMMERCIAL

## 2024-04-12 ENCOUNTER — OFFICE VISIT (OUTPATIENT)
Dept: FAMILY MEDICINE CLINIC | Facility: CLINIC | Age: 44
End: 2024-04-12
Payer: COMMERCIAL

## 2024-04-12 VITALS
OXYGEN SATURATION: 97 % | HEIGHT: 75 IN | BODY MASS INDEX: 39.17 KG/M2 | TEMPERATURE: 97.5 F | WEIGHT: 315 LBS | HEART RATE: 52 BPM | SYSTOLIC BLOOD PRESSURE: 122 MMHG | DIASTOLIC BLOOD PRESSURE: 81 MMHG

## 2024-04-12 DIAGNOSIS — Z51.81 ENCOUNTER FOR MEDICATION MONITORING: ICD-10-CM

## 2024-04-12 DIAGNOSIS — G90.A POSTURAL ORTHOSTATIC TACHYCARDIA SYNDROME (POTS): ICD-10-CM

## 2024-04-12 DIAGNOSIS — Z00.00 ANNUAL PHYSICAL EXAM: Primary | ICD-10-CM

## 2024-04-12 DIAGNOSIS — G47.30 SLEEP APNEA, UNSPECIFIED TYPE: ICD-10-CM

## 2024-04-12 DIAGNOSIS — E03.9 HYPOTHYROIDISM, UNSPECIFIED TYPE: ICD-10-CM

## 2024-04-12 DIAGNOSIS — N18.9 CHRONIC KIDNEY DISEASE, UNSPECIFIED CKD STAGE: ICD-10-CM

## 2024-04-12 DIAGNOSIS — I10 PRIMARY HYPERTENSION: ICD-10-CM

## 2024-04-12 DIAGNOSIS — Z13.220 SCREENING FOR LIPID DISORDERS: ICD-10-CM

## 2024-04-12 DIAGNOSIS — E29.1 HYPOGONADISM IN MALE: ICD-10-CM

## 2024-04-12 DIAGNOSIS — Z79.4 TYPE 2 DIABETES MELLITUS WITH HYPERGLYCEMIA, WITH LONG-TERM CURRENT USE OF INSULIN: ICD-10-CM

## 2024-04-12 DIAGNOSIS — T78.40XD ALLERGY, SUBSEQUENT ENCOUNTER: ICD-10-CM

## 2024-04-12 DIAGNOSIS — E11.65 TYPE 2 DIABETES MELLITUS WITH HYPERGLYCEMIA, WITH LONG-TERM CURRENT USE OF INSULIN: ICD-10-CM

## 2024-04-12 LAB
ALBUMIN SERPL-MCNC: 4.3 G/DL (ref 3.5–5.2)
ALBUMIN/GLOB SERPL: 1.8 G/DL
ALP SERPL-CCNC: 85 U/L (ref 39–117)
ALT SERPL W P-5'-P-CCNC: 41 U/L (ref 1–41)
AMPHET+METHAMPHET UR QL: NEGATIVE
AMPHETAMINE INTERNAL CONTROL: NORMAL
AMPHETAMINES UR QL: NEGATIVE
ANION GAP SERPL CALCULATED.3IONS-SCNC: 7.5 MMOL/L (ref 5–15)
AST SERPL-CCNC: 26 U/L (ref 1–40)
BARBITURATE INTERNAL CONTROL: NORMAL
BARBITURATES UR QL SCN: NEGATIVE
BENZODIAZ UR QL SCN: NEGATIVE
BENZODIAZEPINE INTERNAL CONTROL: NORMAL
BILIRUB SERPL-MCNC: 0.5 MG/DL (ref 0–1.2)
BUN SERPL-MCNC: 13 MG/DL (ref 6–20)
BUN/CREAT SERPL: 10.7 (ref 7–25)
BUPRENORPHINE INTERNAL CONTROL: NORMAL
BUPRENORPHINE SERPL-MCNC: NEGATIVE NG/ML
CALCIUM SPEC-SCNC: 9.4 MG/DL (ref 8.6–10.5)
CANNABINOIDS SERPL QL: NEGATIVE
CHLORIDE SERPL-SCNC: 105 MMOL/L (ref 98–107)
CHOLEST SERPL-MCNC: 189 MG/DL (ref 0–200)
CO2 SERPL-SCNC: 28.5 MMOL/L (ref 22–29)
COCAINE INTERNAL CONTROL: NORMAL
COCAINE UR QL: NEGATIVE
CREAT SERPL-MCNC: 1.21 MG/DL (ref 0.76–1.27)
DEPRECATED RDW RBC AUTO: 41.6 FL (ref 37–54)
EGFRCR SERPLBLD CKD-EPI 2021: 75.7 ML/MIN/1.73
ERYTHROCYTE [DISTWIDTH] IN BLOOD BY AUTOMATED COUNT: 12.6 % (ref 12.3–15.4)
EXPIRATION DATE: NORMAL
GLOBULIN UR ELPH-MCNC: 2.4 GM/DL
GLUCOSE SERPL-MCNC: 120 MG/DL (ref 65–99)
HBA1C MFR BLD: 6.4 % (ref 4.8–5.6)
HCT VFR BLD AUTO: 50.7 % (ref 37.5–51)
HDLC SERPL-MCNC: 34 MG/DL (ref 40–60)
HGB BLD-MCNC: 16 G/DL (ref 13–17.7)
LDLC SERPL CALC-MCNC: 129 MG/DL (ref 0–100)
LDLC/HDLC SERPL: 3.71 {RATIO}
Lab: NORMAL
MCH RBC QN AUTO: 28.3 PG (ref 26.6–33)
MCHC RBC AUTO-ENTMCNC: 31.6 G/DL (ref 31.5–35.7)
MCV RBC AUTO: 89.7 FL (ref 79–97)
MDMA (ECSTASY) INTERNAL CONTROL: NORMAL
MDMA UR QL SCN: NEGATIVE
METHADONE INTERNAL CONTROL: NORMAL
METHADONE UR QL SCN: NEGATIVE
METHAMPHETAMINE INTERNAL CONTROL: NORMAL
MORPHINE INTERNAL CONTROL: NORMAL
MORPHINE/OPIATES SCREEN, URINE: NEGATIVE
OXYCODONE INTERNAL CONTROL: NORMAL
OXYCODONE UR QL SCN: NEGATIVE
PCP UR QL SCN: NEGATIVE
PHENCYCLIDINE INTERNAL CONTROL: NORMAL
PLATELET # BLD AUTO: 211 10*3/MM3 (ref 140–450)
PMV BLD AUTO: 10.9 FL (ref 6–12)
POTASSIUM SERPL-SCNC: 4.7 MMOL/L (ref 3.5–5.2)
PROT SERPL-MCNC: 6.7 G/DL (ref 6–8.5)
PSA SERPL-MCNC: 0.5 NG/ML (ref 0–4)
RBC # BLD AUTO: 5.65 10*6/MM3 (ref 4.14–5.8)
SODIUM SERPL-SCNC: 141 MMOL/L (ref 136–145)
THC INTERNAL CONTROL: NORMAL
TRIGL SERPL-MCNC: 145 MG/DL (ref 0–150)
TSH SERPL DL<=0.05 MIU/L-ACNC: 1.53 UIU/ML (ref 0.27–4.2)
VLDLC SERPL-MCNC: 26 MG/DL (ref 5–40)
WBC NRBC COR # BLD AUTO: 5.67 10*3/MM3 (ref 3.4–10.8)

## 2024-04-12 PROCEDURE — 84402 ASSAY OF FREE TESTOSTERONE: CPT | Performed by: NURSE PRACTITIONER

## 2024-04-12 PROCEDURE — 36415 COLL VENOUS BLD VENIPUNCTURE: CPT | Performed by: NURSE PRACTITIONER

## 2024-04-12 PROCEDURE — 83036 HEMOGLOBIN GLYCOSYLATED A1C: CPT | Performed by: NURSE PRACTITIONER

## 2024-04-12 PROCEDURE — 84153 ASSAY OF PSA TOTAL: CPT | Performed by: NURSE PRACTITIONER

## 2024-04-12 PROCEDURE — 80050 GENERAL HEALTH PANEL: CPT | Performed by: NURSE PRACTITIONER

## 2024-04-12 PROCEDURE — 80061 LIPID PANEL: CPT | Performed by: NURSE PRACTITIONER

## 2024-04-12 PROCEDURE — 84403 ASSAY OF TOTAL TESTOSTERONE: CPT | Performed by: NURSE PRACTITIONER

## 2024-04-12 RX ORDER — LISINOPRIL 2.5 MG/1
2.5 TABLET ORAL DAILY
Qty: 90 TABLET | Refills: 0 | Status: SHIPPED | OUTPATIENT
Start: 2024-04-12

## 2024-04-12 RX ORDER — DAPAGLIFLOZIN 10 MG/1
1 TABLET, FILM COATED ORAL EVERY MORNING
Qty: 90 TABLET | Refills: 0 | Status: SHIPPED | OUTPATIENT
Start: 2024-04-12

## 2024-04-12 RX ORDER — CETIRIZINE HYDROCHLORIDE 10 MG/1
10 TABLET ORAL DAILY
COMMUNITY

## 2024-04-12 RX ORDER — SEMAGLUTIDE 0.68 MG/ML
0.5 INJECTION, SOLUTION SUBCUTANEOUS WEEKLY
Qty: 3 ML | Refills: 1 | Status: SHIPPED | OUTPATIENT
Start: 2024-04-12

## 2024-04-12 RX ORDER — AMLODIPINE BESYLATE 10 MG/1
10 TABLET ORAL DAILY
Qty: 90 TABLET | Refills: 0 | Status: SHIPPED | OUTPATIENT
Start: 2024-04-12

## 2024-04-12 RX ORDER — LEVOTHYROXINE SODIUM 0.07 MG/1
75 TABLET ORAL EVERY MORNING
Qty: 90 TABLET | Refills: 0 | Status: SHIPPED | OUTPATIENT
Start: 2024-04-12

## 2024-04-12 RX ORDER — MONTELUKAST SODIUM 10 MG/1
10 TABLET ORAL NIGHTLY
Qty: 90 TABLET | Refills: 0 | Status: SHIPPED | OUTPATIENT
Start: 2024-04-12

## 2024-04-12 RX ORDER — BISOPROLOL FUMARATE 5 MG/1
5 TABLET, FILM COATED ORAL NIGHTLY
Qty: 90 TABLET | Refills: 0 | Status: SHIPPED | OUTPATIENT
Start: 2024-04-12

## 2024-04-12 NOTE — PATIENT INSTRUCTIONS
Diabetes Mellitus and Nutrition, Adult  When you have diabetes, or diabetes mellitus, it is very important to have healthy eating habits because your blood sugar (glucose) levels are greatly affected by what you eat and drink. Eating healthy foods in the right amounts, at about the same times every day, can help you:  Manage your blood glucose.  Lower your risk of heart disease.  Improve your blood pressure.  Reach or maintain a healthy weight.  What can affect my meal plan?  Every person with diabetes is different, and each person has different needs for a meal plan. Your health care provider may recommend that you work with a dietitian to make a meal plan that is best for you. Your meal plan may vary depending on factors such as:  The calories you need.  The medicines you take.  Your weight.  Your blood glucose, blood pressure, and cholesterol levels.  Your activity level.  Other health conditions you have, such as heart or kidney disease.  How do carbohydrates affect me?  Carbohydrates, also called carbs, affect your blood glucose level more than any other type of food. Eating carbs raises the amount of glucose in your blood.  It is important to know how many carbs you can safely have in each meal. This is different for every person. Your dietitian can help you calculate how many carbs you should have at each meal and for each snack.  How does alcohol affect me?  Alcohol can cause a decrease in blood glucose (hypoglycemia), especially if you use insulin or take certain diabetes medicines by mouth. Hypoglycemia can be a life-threatening condition. Symptoms of hypoglycemia, such as sleepiness, dizziness, and confusion, are similar to symptoms of having too much alcohol.  Do not drink alcohol if:  Your health care provider tells you not to drink.  You are pregnant, may be pregnant, or are planning to become pregnant.  If you drink alcohol:  Limit how much you have to:  0-1 drink a day for women.  0-2 drinks a day  "for men.  Know how much alcohol is in your drink. In the [place] one drink equals one 12 oz bottle of beer (355 mL), one 5 oz glass of wine (148 mL), or one 1½ oz glass of hard liquor (44 mL).  Keep yourself hydrated with water, diet soda, or unsweetened iced tea. Keep in mind that regular soda, juice, and other mixers may contain a lot of sugar and must be counted as carbs.  What are tips for following this plan?    Reading food labels  Start by checking the serving size on the Nutrition Facts label of packaged foods and drinks. The number of calories and the amount of carbs, fats, and other nutrients listed on the label are based on one serving of the item. Many items contain more than one serving per package.  Check the total grams (g) of carbs in one serving.  Check the number of grams of saturated fats and trans fats in one serving. Choose foods that have a low amount or none of these fats.  Check the number of milligrams (mg) of salt (sodium) in one serving. Most people should limit total sodium intake to less than 2,300 mg per day.  Always check the nutrition information of foods labeled as \"low-fat\" or \"nonfat.\" These foods may be higher in added sugar or refined carbs and should be avoided.  Talk to your dietitian to identify your daily goals for nutrients listed on the label.  Shopping  Avoid buying canned, pre-made, or processed foods. These foods tend to be high in fat, sodium, and added sugar.  Shop around the outside edge of the grocery store. This is where you will most often find fresh fruits and vegetables, bulk grains, fresh meats, and fresh dairy products.  Cooking  Use low-heat cooking methods, such as baking, instead of high-heat cooking methods, such as deep frying.  Cook using healthy oils, such as olive, canola, or sunflower oil.  Avoid cooking with butter, cream, or high-fat meats.  Meal planning  Eat meals and snacks regularly, preferably at the same times every day. Avoid going long periods " of time without eating.  Eat foods that are high in fiber, such as fresh fruits, vegetables, beans, and whole grains.  Eat 4-6 oz (112-168 g) of lean protein each day, such as lean meat, chicken, fish, eggs, or tofu. One ounce (oz) (28 g) of lean protein is equal to:  1 oz (28 g) of meat, chicken, or fish.  1 egg.  ¼ cup (62 g) of tofu.  Eat some foods each day that contain healthy fats, such as avocado, nuts, seeds, and fish.  What foods should I eat?  Fruits  Berries. Apples. Oranges. Peaches. Apricots. Plums. Grapes. Mangoes. Papayas. Pomegranates. Kiwi. Cherries.  Vegetables  Leafy greens, including lettuce, spinach, kale, chard, minerva greens, mustard greens, and cabbage. Beets. Cauliflower. Broccoli. Carrots. Green beans. Tomatoes. Peppers. Onions. Cucumbers. Weed sprouts.  Grains  Whole grains, such as whole-wheat or whole-grain bread, crackers, tortillas, cereal, and pasta. Unsweetened oatmeal. Quinoa. Brown or wild rice.  Meats and other proteins  Seafood. Poultry without skin. Lean cuts of poultry and beef. Tofu. Nuts. Seeds.  Dairy  Low-fat or fat-free dairy products such as milk, yogurt, and cheese.  The items listed above may not be a complete list of foods and beverages you can eat and drink. Contact a dietitian for more information.  What foods should I avoid?  Fruits  Fruits canned with syrup.  Vegetables  Canned vegetables. Frozen vegetables with butter or cream sauce.  Grains  Refined white flour and flour products such as bread, pasta, snack foods, and cereals. Avoid all processed foods.  Meats and other proteins  Fatty cuts of meat. Poultry with skin. Breaded or fried meats. Processed meat. Avoid saturated fats.  Dairy  Full-fat yogurt, cheese, or milk.  Beverages  Sweetened drinks, such as soda or iced tea.  The items listed above may not be a complete list of foods and beverages you should avoid. Contact a dietitian for more information.  Questions to ask a health care provider  Do I need  to meet with a certified diabetes care and ?  Do I need to meet with a dietitian?  What number can I call if I have questions?  When are the best times to check my blood glucose?  Where to find more information:  American Diabetes Association: diabetes.org  Academy of Nutrition and Dietetics: eatright.org  National Trent of Diabetes and Digestive and Kidney Diseases: niddk.nih.gov  Association of Diabetes Care & Education Specialists: diabeteseducator.org  Summary  It is important to have healthy eating habits because your blood sugar (glucose) levels are greatly affected by what you eat and drink. It is important to use alcohol carefully.  A healthy meal plan will help you manage your blood glucose and lower your risk of heart disease.  Your health care provider may recommend that you work with a dietitian to make a meal plan that is best for you.  This information is not intended to replace advice given to you by your health care provider. Make sure you discuss any questions you have with your health care provider.  Document Revised: 07/21/2021 Document Reviewed: 07/21/2021  Elsevier Patient Education © 2023 [employer] Inc[employer]

## 2024-04-18 LAB
TESTOST FREE SERPL-MCNC: 6.8 PG/ML (ref 6.8–21.5)
TESTOST SERPL-MCNC: 322 NG/DL (ref 264–916)

## 2024-06-21 RX ORDER — PROCHLORPERAZINE 25 MG/1
SUPPOSITORY RECTAL
Qty: 3 EACH | Refills: 0 | Status: SHIPPED | OUTPATIENT
Start: 2024-06-21

## 2024-07-01 DIAGNOSIS — E29.1 HYPOGONADISM IN MALE: Primary | ICD-10-CM

## 2024-07-01 RX ORDER — TESTOSTERONE 20.25 MG/1.25G
40.5 GEL TOPICAL EVERY MORNING
Qty: 37.5 G | Refills: 0 | Status: SHIPPED | OUTPATIENT
Start: 2024-07-01

## 2024-09-10 DIAGNOSIS — I10 PRIMARY HYPERTENSION: ICD-10-CM

## 2024-09-10 DIAGNOSIS — E03.9 HYPOTHYROIDISM, UNSPECIFIED TYPE: ICD-10-CM

## 2024-09-10 RX ORDER — AMLODIPINE BESYLATE 10 MG/1
10 TABLET ORAL DAILY
Qty: 90 TABLET | Refills: 0 | Status: SHIPPED | OUTPATIENT
Start: 2024-09-10

## 2024-09-10 RX ORDER — LEVOTHYROXINE SODIUM 75 UG/1
75 TABLET ORAL EVERY MORNING
Qty: 90 TABLET | Refills: 0 | Status: SHIPPED | OUTPATIENT
Start: 2024-09-10

## 2024-09-21 DIAGNOSIS — Z79.4 TYPE 2 DIABETES MELLITUS WITH HYPERGLYCEMIA, WITH LONG-TERM CURRENT USE OF INSULIN: ICD-10-CM

## 2024-09-21 DIAGNOSIS — N18.9 CHRONIC KIDNEY DISEASE, UNSPECIFIED CKD STAGE: ICD-10-CM

## 2024-09-21 DIAGNOSIS — E11.65 TYPE 2 DIABETES MELLITUS WITH HYPERGLYCEMIA, WITH LONG-TERM CURRENT USE OF INSULIN: ICD-10-CM

## 2024-09-23 RX ORDER — DAPAGLIFLOZIN 10 MG/1
1 TABLET, FILM COATED ORAL EVERY MORNING
Qty: 90 TABLET | Refills: 1 | Status: SHIPPED | OUTPATIENT
Start: 2024-09-23

## 2024-10-14 RX ORDER — PROCHLORPERAZINE 25 MG/1
SUPPOSITORY RECTAL
Qty: 3 EACH | Refills: 0 | Status: SHIPPED | OUTPATIENT
Start: 2024-10-14

## 2024-11-12 ENCOUNTER — TELEMEDICINE (OUTPATIENT)
Dept: FAMILY MEDICINE CLINIC | Facility: CLINIC | Age: 44
End: 2024-11-12
Payer: COMMERCIAL

## 2024-11-12 DIAGNOSIS — Z79.4 TYPE 2 DIABETES MELLITUS WITH HYPERGLYCEMIA, WITH LONG-TERM CURRENT USE OF INSULIN: Primary | ICD-10-CM

## 2024-11-12 DIAGNOSIS — E11.65 TYPE 2 DIABETES MELLITUS WITH HYPERGLYCEMIA, WITH LONG-TERM CURRENT USE OF INSULIN: Primary | ICD-10-CM

## 2024-11-12 DIAGNOSIS — E03.9 HYPOTHYROIDISM, UNSPECIFIED TYPE: ICD-10-CM

## 2024-11-12 DIAGNOSIS — I10 PRIMARY HYPERTENSION: ICD-10-CM

## 2024-11-12 DIAGNOSIS — T78.40XD ALLERGY, SUBSEQUENT ENCOUNTER: ICD-10-CM

## 2024-11-12 DIAGNOSIS — E29.1 HYPOGONADISM IN MALE: ICD-10-CM

## 2024-11-12 RX ORDER — AMLODIPINE BESYLATE 10 MG/1
10 TABLET ORAL DAILY
Qty: 90 TABLET | Refills: 1 | Status: SHIPPED | OUTPATIENT
Start: 2024-11-12

## 2024-11-12 RX ORDER — LEVOTHYROXINE SODIUM 75 UG/1
75 TABLET ORAL EVERY MORNING
Qty: 90 TABLET | Refills: 1 | Status: SHIPPED | OUTPATIENT
Start: 2024-11-12

## 2024-11-12 RX ORDER — MONTELUKAST SODIUM 10 MG/1
10 TABLET ORAL NIGHTLY
Qty: 90 TABLET | Refills: 0 | Status: SHIPPED | OUTPATIENT
Start: 2024-11-12

## 2024-11-12 RX ORDER — LISINOPRIL 2.5 MG/1
2.5 TABLET ORAL DAILY
Qty: 90 TABLET | Refills: 0 | Status: SHIPPED | OUTPATIENT
Start: 2024-11-12

## 2024-11-12 NOTE — PROGRESS NOTES
Tele-Health Visit Note  Subjective     Sean Gore is a 44 y.o. male.     Chief Complaint   Patient presents with    Diabetes     Follow up       History of Present Illness  The patient presents via virtual visit for a routine checkup.    He reports no significant changes in his health status. His blood sugar levels have remained stable, with no instances of hypoglycemia or hyperglycemia. He takes his thyroid medication daily and maintains a normal energy level. His blood pressure readings at home have been within the normal range. He denies experiencing any chest pain or shortness of breath. He has not yet started testosterone therapy. His sleep quality is satisfactory and he has not experienced any fainting episodes.    Review of Systems   Constitutional:  Negative for fatigue.   Respiratory:  Negative for cough and shortness of breath.    Cardiovascular:  Negative for chest pain and palpitations.   Gastrointestinal:  Negative for nausea and vomiting.   Endocrine: Negative for cold intolerance and heat intolerance.   Genitourinary:  Negative for difficulty urinating and dysuria.   Musculoskeletal:  Negative for arthralgias, gait problem and joint swelling.   Neurological:  Negative for dizziness, seizures and headaches.   Hematological:  Negative for adenopathy. Does not bruise/bleed easily.   Psychiatric/Behavioral:  Negative for confusion, dysphoric mood and sleep disturbance. The patient is not nervous/anxious.        Objective     Gen: well-nourished, no acute distress  HENT: atraumatic, normocephalic  Eyes: extraocular movements intact, no scleral icterus  Lungs: breathing comfortably, no cough  Neuro: grossly oriented to person, place, and time. no facial droop   Psych: normal mood and affect    Results  Laboratory Studies  Hemoglobin A1c was 6.4.       Assessment & Plan     Assessment & Plan  1. Diabetes Mellitus.  His last hemoglobin A1c was 6.4, indicating good control of his blood sugar levels.  Diabetic labs have been scheduled for 6 months from now.    2. Hypertension.  A prescription for amlodipine has been refilled.    3. Hypothyroidism.  His last thyroid levels were within normal limits. A prescription for thyroid medication has been refilled.    4. Asthma.  A prescription for montelukast has been refilled.    5. Health Maintenance.  Labs have been ordered for today, including testosterone level and PSA.    Follow-up  Patient is scheduled for a follow-up visit in 6 months.    Diagnoses and all orders for this visit:    1. Type 2 diabetes mellitus with hyperglycemia, with long-term current use of insulin (Primary)  -     Comprehensive Metabolic Panel  -     Lipid Panel  -     Hemoglobin A1c  -     TSH+Free T4; Future  -     lisinopril (PRINIVIL,ZESTRIL) 2.5 MG tablet; Take 1 tablet by mouth Daily.  Dispense: 90 tablet; Refill: 0  -     Comprehensive Metabolic Panel; Future  -     Lipid Panel; Future  -     Hemoglobin A1c; Future  -     Microalbumin / Creatinine Urine Ratio - Urine, Clean Catch; Future    2. Primary hypertension  -     amLODIPine (NORVASC) 10 MG tablet; Take 1 tablet by mouth Daily.  Dispense: 90 tablet; Refill: 1  -     lisinopril (PRINIVIL,ZESTRIL) 2.5 MG tablet; Take 1 tablet by mouth Daily.  Dispense: 90 tablet; Refill: 0    3. Hypothyroidism, unspecified type  -     levothyroxine (SYNTHROID, LEVOTHROID) 75 MCG tablet; Take 1 tablet by mouth Every Morning.  Dispense: 90 tablet; Refill: 1    4. Allergy, subsequent encounter  -     montelukast (SINGULAIR) 10 MG tablet; Take 1 tablet by mouth Every Night.  Dispense: 90 tablet; Refill: 0    5. Hypogonadism in male  -     PSA DIAGNOSTIC  -     Testosterone, Free, Total            Mode of Visit: Video  Location of patient: -HOME-  Location of provider: +HOME+  You have chosen to receive care through a telehealth visit.  The patient has signed the video visit consent form.  The visit included audio and video interaction. No technical issues  occurred during this visit.    Return in about 6 months (around 5/12/2025) for Next scheduled follow up.      HELENA Giles  11/12/2024  09:40 EST      Patient or patient representative verbalized consent for the use of Ambient Listening during the visit with  HELENA Giles for chart documentation. 11/12/2024  09:36 EST

## 2025-01-30 RX ORDER — PROCHLORPERAZINE 25 MG/1
SUPPOSITORY RECTAL
Qty: 3 EACH | Refills: 0 | Status: SHIPPED | OUTPATIENT
Start: 2025-01-30

## 2025-03-29 DIAGNOSIS — E11.65 TYPE 2 DIABETES MELLITUS WITH HYPERGLYCEMIA, WITH LONG-TERM CURRENT USE OF INSULIN: ICD-10-CM

## 2025-03-29 DIAGNOSIS — Z79.4 TYPE 2 DIABETES MELLITUS WITH HYPERGLYCEMIA, WITH LONG-TERM CURRENT USE OF INSULIN: ICD-10-CM

## 2025-03-29 DIAGNOSIS — N18.9 CHRONIC KIDNEY DISEASE, UNSPECIFIED CKD STAGE: ICD-10-CM

## 2025-03-31 RX ORDER — DAPAGLIFLOZIN 10 MG/1
1 TABLET, FILM COATED ORAL EVERY MORNING
Qty: 90 TABLET | Refills: 0 | Status: SHIPPED | OUTPATIENT
Start: 2025-03-31

## 2025-04-22 DIAGNOSIS — E11.65 TYPE 2 DIABETES MELLITUS WITH HYPERGLYCEMIA, WITH LONG-TERM CURRENT USE OF INSULIN: ICD-10-CM

## 2025-04-22 DIAGNOSIS — N18.9 CHRONIC KIDNEY DISEASE, UNSPECIFIED CKD STAGE: ICD-10-CM

## 2025-04-22 DIAGNOSIS — I10 PRIMARY HYPERTENSION: ICD-10-CM

## 2025-04-22 DIAGNOSIS — Z79.4 TYPE 2 DIABETES MELLITUS WITH HYPERGLYCEMIA, WITH LONG-TERM CURRENT USE OF INSULIN: ICD-10-CM

## 2025-04-22 DIAGNOSIS — E03.9 HYPOTHYROIDISM, UNSPECIFIED TYPE: ICD-10-CM

## 2025-04-22 RX ORDER — DAPAGLIFLOZIN 10 MG/1
1 TABLET, FILM COATED ORAL EVERY MORNING
Qty: 30 TABLET | Refills: 0 | Status: SHIPPED | OUTPATIENT
Start: 2025-04-22

## 2025-04-22 RX ORDER — LEVOTHYROXINE SODIUM 75 UG/1
75 TABLET ORAL EVERY MORNING
Qty: 30 TABLET | Refills: 0 | Status: SHIPPED | OUTPATIENT
Start: 2025-04-22

## 2025-04-22 RX ORDER — AMLODIPINE BESYLATE 10 MG/1
10 TABLET ORAL DAILY
Qty: 30 TABLET | Refills: 0 | Status: SHIPPED | OUTPATIENT
Start: 2025-04-22

## 2025-05-19 DIAGNOSIS — T78.40XD ALLERGY, SUBSEQUENT ENCOUNTER: ICD-10-CM

## 2025-05-19 RX ORDER — MONTELUKAST SODIUM 10 MG/1
10 TABLET ORAL NIGHTLY
Qty: 90 TABLET | Refills: 0 | Status: SHIPPED | OUTPATIENT
Start: 2025-05-19

## 2025-06-24 NOTE — PROGRESS NOTES
Chief Complaint  Diabetes (Follow up), Back Pain (Lower; for a while, has got better), and Constipation (Off and on for a long time)    Subjective          Sean Gore is a 45 y.o. male who presents to [facility]    History of Present Illness  History of Present Illness  The patient presents for evaluation of constipation, back pain, and sleep apnea.    He has been experiencing severe constipation, which he attributes to his metformin medication. Despite trying various remedies, including coffee, MiraLAX, and stool softeners, his condition remains unchanged. He has been taking MiraLAX every morning but finds it overwhelming as it takes effect all at once. He has also been using a stool softener frequently, but without success. He underwent a colonoscopy in 2023. Additionally, he has been taking his thyroid medication every morning on an empty stomach.    He reports lower back pain, which was initially severe but has since improved following chiropractic treatment. The pain does not radiate down either leg. He had difficulty moving up and down due to the pain.     He uses a CPAP machine nightly and reports good sleep quality. He has not noticed any leaks from the machine and believes he can sleep without it.    His blood pressure is borderline this morning. He has not checked it at home recently.    He has been feeling run down and is concerned about his sugar levels because he does not check them and always drinks a lot of water. He has been drinking an excessive amount even before the hot weather started. He is worried that his sugar levels might be high.    He has been prescribed testosterone but has not yet started the treatment.    PAST SURGICAL HISTORY:  Colonoscopy in 2023.      Little interest or pleasure in doing things? Not at all   Feeling down, depressed, or hopeless? Not at all   PHQ-2 Total Score 0                Health Maintenance Due   Topic Date Due     DIABETIC FOOT EXAM  Never done    Hepatitis B (1 of 3 - 19+ 3-dose series) Never done    Pneumococcal Vaccine 0-49 (1 of 2 - PCV) Never done    TDAP/TD VACCINES (2 - Tdap) 01/19/2015    URINE MICROALBUMIN-CREATININE RATIO (uACR)  06/24/2023    COVID-19 Vaccine (1 - 2024-25 season) Never done    HEMOGLOBIN A1C  10/12/2024    ANNUAL PHYSICAL  04/12/2025    INFLUENZA VACCINE  07/01/2025        Review of Systems   Endocrine: Positive for polydipsia. Negative for polyuria.   Musculoskeletal:  Positive for back pain.   Neurological:  Positive for headaches. Negative for tremors and speech difficulty.   Psychiatric/Behavioral:  Negative for confusion.           Medical History: has a past medical history of Allergic, Asthma, Colon polyp, Diabetes mellitus, Fatty liver, Headache, Hyperlipidemia, Hypertension, Hypogonadism in male, Hypothyroidism, Plantar fasciitis, Sleep apnea (06/10/2018), Ulcerative colitis, and Vitamin D deficiency.     Surgical History: has a past surgical history that includes Cyst Removal; Colonoscopy (2007); Colonoscopy (N/A, 09/20/2023); and Vasectomy (9/25/23).     Family History: family history includes Breast cancer in his paternal grandmother; Cancer in his maternal grandfather and mother; Diabetes in his maternal grandmother; Hyperlipidemia in his maternal grandfather; Hypertension in his maternal grandfather and mother; Prostate cancer in his maternal grandfather.     Social History: reports that he has never smoked. He has never used smokeless tobacco. He reports that he does not drink alcohol and does not use drugs.    Allergies: Metformin      Current Outpatient Medications:     amLODIPine (NORVASC) 10 MG tablet, Take 1 tablet by mouth once daily, Disp: 30 tablet, Rfl: 0    aspirin 81 MG chewable tablet, aspirin 81 mg oral tablet,chewable chew 1 tablet (81 mg) by oral route once daily   Active, Disp: , Rfl:     cetirizine (zyrTEC) 10 MG tablet, Take 1 tablet by mouth Daily., Disp: , Rfl:     " Continuous Blood Gluc  (Dexcom G6 ) device, AS DIRECTED, Disp: 1 each, Rfl: 0    Continuous Glucose Sensor (Dexcom G6 Sensor), CHANGE EVERY 10 DAYS, Disp: 3 each, Rfl: 0    Farxiga 10 MG tablet, TAKE 1 TABLET BY MOUTH IN THE MORNING, Disp: 30 tablet, Rfl: 0    glucose blood test strip, Use as instructed, Disp: 100 each, Rfl: 2    glucose monitor monitoring kit, 1 each Daily., Disp: 1 each, Rfl: 0    Lancets 28G misc, 1 Units Daily., Disp: 100 each, Rfl: 1    levothyroxine (SYNTHROID, LEVOTHROID) 75 MCG tablet, TAKE 1 TABLET BY MOUTH ONCE DAILY IN THE MORNING, Disp: 30 tablet, Rfl: 0    montelukast (SINGULAIR) 10 MG tablet, TAKE 1 TABLET BY MOUTH ONCE DAILY AT NIGHT, Disp: 90 tablet, Rfl: 0    methocarbamol (ROBAXIN) 500 MG tablet, Take 1 tablet by mouth 3 (Three) Times a Day As Needed for Muscle Spasms., Disp: 90 tablet, Rfl: 1      Immunization History   Administered Date(s) Administered    Td (TDVAX) 01/19/2005         Objective       Vitals:    07/03/25 0803   BP: 139/90   Pulse: 67   Temp: 97.8 °F (36.6 °C)   TempSrc: Temporal   SpO2: 97%   Weight: (!) 151 kg (332 lb 6.4 oz)  Comment: steel toe shoes   Height: 190.5 cm (75\")      Body mass index is 41.55 kg/m².   Wt Readings from Last 3 Encounters:   07/03/25 (!) 151 kg (332 lb 6.4 oz)   04/12/24 (!) 150 kg (331 lb)   10/02/23 (!) 149 kg (327 lb 12.8 oz)      BP Readings from Last 3 Encounters:   07/03/25 139/90   04/12/24 122/81   10/02/23 127/79             Physical Exam  Vitals reviewed.   Constitutional:       Appearance: Normal appearance. He is well-developed.   HENT:      Head: Normocephalic and atraumatic.   Eyes:      Conjunctiva/sclera: Conjunctivae normal.      Pupils: Pupils are equal, round, and reactive to light.   Cardiovascular:      Rate and Rhythm: Normal rate and regular rhythm.      Heart sounds: Normal heart sounds. No murmur heard.  Pulmonary:      Effort: Pulmonary effort is normal.      Breath sounds: Normal breath " sounds. No wheezing or rhonchi.   Abdominal:      General: Bowel sounds are normal. There is no distension.      Palpations: Abdomen is soft.      Tenderness: There is no abdominal tenderness.   Skin:     General: Skin is warm and dry.   Neurological:      Mental Status: He is alert and oriented to person, place, and time.   Psychiatric:         Mood and Affect: Mood and affect normal.         Behavior: Behavior normal.         Thought Content: Thought content normal.         Judgment: Judgment normal.         Physical Exam        Result Review :   Results                            Assessment and Plan        Diagnoses and all orders for this visit:    1. Type 2 diabetes mellitus with hyperglycemia, with long-term current use of insulin (Primary)  -     Cancel: Comprehensive Metabolic Panel  -     Cancel: Lipid Panel  -     Cancel: Hemoglobin A1c  -     Cancel: Microalbumin / Creatinine Urine Ratio - Urine, Clean Catch  -     TSH+Free T4  -     Comprehensive Metabolic Panel  -     Lipid Panel  -     Hemoglobin A1c  -     Microalbumin / Creatinine Urine Ratio - Urine, Clean Catch    2. Hypothyroidism, unspecified type  -     Cancel: TSH    3. Acute midline low back pain without sciatica  -     XR Spine Lumbar 2 or 3 View; Future  -     methocarbamol (ROBAXIN) 500 MG tablet; Take 1 tablet by mouth 3 (Three) Times a Day As Needed for Muscle Spasms.  Dispense: 90 tablet; Refill: 1    4. Hypogonadism in male  -     Testosterone, Free, Total  -     PSA DIAGNOSTIC        Assessment & Plan  1. Constipation.  - He is advised to take MiraLAX daily, increase water intake, and engage in regular walking exercises.  - If MiraLAX causes sudden bowel movements, he should adjust the dosage to half a capful and take it in the evening.  - He has been experiencing severe constipation and hemorrhoids despite taking stool softeners.  - An x-ray of his back will be ordered. A muscle relaxer will be prescribed for use as needed.    2. Back  pain.  - He reports lower back pain that improves with chiropractic care but worsens after long drives.  - No radiation down the legs noted.  - An x-ray of his back will be ordered.  - A muscle relaxer will be prescribed for use as needed, especially after long drives or periods of sitting.    3. Sleep apnea.  - He uses a CPAP machine every night but has not monitored for leaks or sleep quality.  - He is advised to download the [employer] jose to monitor his sleep quality and CPAP machine performance.  - The jose will provide a score and details on usage, leaks, and mask adjustments.    4. Borderline hypertension.  - His blood pressure is borderline this morning.  - He should continue monitoring his blood pressure at home.  - No changes in medication noted.    5. Suspected diabetes mellitus.  - He has been drinking excessive amounts of water and feeling run down, raising concerns about elevated blood sugar levels.  - He is advised to monitor his blood sugar levels at home.  - Labs for diabetes will be conducted today.    6. Hypothyroidism.  - He should continue taking his thyroid medication every morning on an empty stomach.  - Thyroid function tests will be conducted today.  - No changes in medication noted.    7. Low testosterone.  - He is advised to start the testosterone therapy as previously prescribed.  - Testosterone levels will be checked today.  - Labs for testosterone and PSA are already ordered.      Patient was given instructions and counseling regarding his condition or for health maintenance advice. Please see specific information pulled into the AVS if appropriate.     HELENA Giles    Patient or patient representative verbalized consent for the use of Ambient Listening during the visit with  HELENA Giles for chart documentation. 7/3/2025  08:41 EDT

## 2025-07-03 ENCOUNTER — OFFICE VISIT (OUTPATIENT)
Dept: FAMILY MEDICINE CLINIC | Facility: CLINIC | Age: 45
End: 2025-07-03
Payer: COMMERCIAL

## 2025-07-03 VITALS
WEIGHT: 315 LBS | SYSTOLIC BLOOD PRESSURE: 139 MMHG | OXYGEN SATURATION: 97 % | HEART RATE: 67 BPM | HEIGHT: 75 IN | DIASTOLIC BLOOD PRESSURE: 90 MMHG | TEMPERATURE: 97.8 F | BODY MASS INDEX: 39.17 KG/M2

## 2025-07-03 DIAGNOSIS — M54.50 ACUTE MIDLINE LOW BACK PAIN WITHOUT SCIATICA: ICD-10-CM

## 2025-07-03 DIAGNOSIS — E11.65 TYPE 2 DIABETES MELLITUS WITH HYPERGLYCEMIA, WITH LONG-TERM CURRENT USE OF INSULIN: Primary | ICD-10-CM

## 2025-07-03 DIAGNOSIS — E29.1 HYPOGONADISM IN MALE: ICD-10-CM

## 2025-07-03 DIAGNOSIS — Z79.4 TYPE 2 DIABETES MELLITUS WITH HYPERGLYCEMIA, WITH LONG-TERM CURRENT USE OF INSULIN: Primary | ICD-10-CM

## 2025-07-03 DIAGNOSIS — E03.9 HYPOTHYROIDISM, UNSPECIFIED TYPE: ICD-10-CM

## 2025-07-03 LAB
ALBUMIN SERPL-MCNC: 4.4 G/DL (ref 3.5–5.2)
ALBUMIN UR-MCNC: 1.9 MG/DL
ALBUMIN/GLOB SERPL: 2 G/DL
ALP SERPL-CCNC: 100 U/L (ref 39–117)
ALT SERPL W P-5'-P-CCNC: 64 U/L (ref 1–41)
ANION GAP SERPL CALCULATED.3IONS-SCNC: 9.9 MMOL/L (ref 5–15)
AST SERPL-CCNC: 39 U/L (ref 1–40)
BILIRUB SERPL-MCNC: 0.6 MG/DL (ref 0–1.2)
BUN SERPL-MCNC: 14 MG/DL (ref 6–20)
BUN/CREAT SERPL: 12 (ref 7–25)
CALCIUM SPEC-SCNC: 8.9 MG/DL (ref 8.6–10.5)
CHLORIDE SERPL-SCNC: 105 MMOL/L (ref 98–107)
CHOLEST SERPL-MCNC: 187 MG/DL (ref 0–200)
CO2 SERPL-SCNC: 24.1 MMOL/L (ref 22–29)
CREAT SERPL-MCNC: 1.17 MG/DL (ref 0.76–1.27)
CREAT UR-MCNC: 73.9 MG/DL
EGFRCR SERPLBLD CKD-EPI 2021: 77.9 ML/MIN/1.73
GLOBULIN UR ELPH-MCNC: 2.2 GM/DL
GLUCOSE SERPL-MCNC: 154 MG/DL (ref 65–99)
HBA1C MFR BLD: 7.6 % (ref 4.8–5.6)
HDLC SERPL-MCNC: 33 MG/DL (ref 40–60)
LDLC SERPL CALC-MCNC: 116 MG/DL (ref 0–100)
LDLC/HDLC SERPL: 3.37 {RATIO}
MICROALBUMIN/CREAT UR: 25.7 MG/G (ref 0–29)
POTASSIUM SERPL-SCNC: 4 MMOL/L (ref 3.5–5.2)
PROT SERPL-MCNC: 6.6 G/DL (ref 6–8.5)
PSA SERPL-MCNC: 0.53 NG/ML (ref 0–4)
SODIUM SERPL-SCNC: 139 MMOL/L (ref 136–145)
T4 FREE SERPL-MCNC: 1.17 NG/DL (ref 0.92–1.68)
TRIGL SERPL-MCNC: 214 MG/DL (ref 0–150)
TSH SERPL DL<=0.05 MIU/L-ACNC: 2.04 UIU/ML (ref 0.27–4.2)
VLDLC SERPL-MCNC: 38 MG/DL (ref 5–40)

## 2025-07-03 PROCEDURE — 82043 UR ALBUMIN QUANTITATIVE: CPT | Performed by: NURSE PRACTITIONER

## 2025-07-03 PROCEDURE — 80061 LIPID PANEL: CPT | Performed by: NURSE PRACTITIONER

## 2025-07-03 PROCEDURE — 84153 ASSAY OF PSA TOTAL: CPT | Performed by: NURSE PRACTITIONER

## 2025-07-03 PROCEDURE — 84402 ASSAY OF FREE TESTOSTERONE: CPT | Performed by: NURSE PRACTITIONER

## 2025-07-03 PROCEDURE — 80053 COMPREHEN METABOLIC PANEL: CPT | Performed by: NURSE PRACTITIONER

## 2025-07-03 PROCEDURE — 83036 HEMOGLOBIN GLYCOSYLATED A1C: CPT | Performed by: NURSE PRACTITIONER

## 2025-07-03 PROCEDURE — 82570 ASSAY OF URINE CREATININE: CPT | Performed by: NURSE PRACTITIONER

## 2025-07-03 PROCEDURE — 84443 ASSAY THYROID STIM HORMONE: CPT | Performed by: NURSE PRACTITIONER

## 2025-07-03 PROCEDURE — 84439 ASSAY OF FREE THYROXINE: CPT | Performed by: NURSE PRACTITIONER

## 2025-07-03 PROCEDURE — 84403 ASSAY OF TOTAL TESTOSTERONE: CPT | Performed by: NURSE PRACTITIONER

## 2025-07-03 RX ORDER — METHOCARBAMOL 500 MG/1
500 TABLET, FILM COATED ORAL 3 TIMES DAILY PRN
Qty: 90 TABLET | Refills: 1 | Status: SHIPPED | OUTPATIENT
Start: 2025-07-03

## 2025-07-07 LAB
TESTOST FREE SERPL-MCNC: 9.7 PG/ML (ref 6.8–21.5)
TESTOST SERPL-MCNC: 368 NG/DL (ref 264–916)

## 2025-07-22 DIAGNOSIS — E03.9 HYPOTHYROIDISM, UNSPECIFIED TYPE: ICD-10-CM

## 2025-07-22 RX ORDER — LEVOTHYROXINE SODIUM 75 UG/1
75 TABLET ORAL EVERY MORNING
Qty: 90 TABLET | Refills: 0 | Status: SHIPPED | OUTPATIENT
Start: 2025-07-22

## 2025-08-04 DIAGNOSIS — E11.65 TYPE 2 DIABETES MELLITUS WITH HYPERGLYCEMIA, WITH LONG-TERM CURRENT USE OF INSULIN: ICD-10-CM

## 2025-08-04 DIAGNOSIS — N18.9 CHRONIC KIDNEY DISEASE, UNSPECIFIED CKD STAGE: ICD-10-CM

## 2025-08-04 DIAGNOSIS — Z79.4 TYPE 2 DIABETES MELLITUS WITH HYPERGLYCEMIA, WITH LONG-TERM CURRENT USE OF INSULIN: ICD-10-CM

## 2025-08-05 RX ORDER — DAPAGLIFLOZIN 10 MG/1
1 TABLET, FILM COATED ORAL EVERY MORNING
Qty: 30 TABLET | Refills: 0 | Status: SHIPPED | OUTPATIENT
Start: 2025-08-05

## 2025-08-05 RX ORDER — ORAL SEMAGLUTIDE 3 MG/1
1 TABLET ORAL EVERY MORNING
Qty: 30 TABLET | Refills: 0 | Status: SHIPPED | OUTPATIENT
Start: 2025-08-05

## 2025-08-07 DIAGNOSIS — I10 PRIMARY HYPERTENSION: ICD-10-CM

## 2025-08-08 RX ORDER — AMLODIPINE BESYLATE 10 MG/1
10 TABLET ORAL DAILY
Qty: 30 TABLET | Refills: 0 | Status: SHIPPED | OUTPATIENT
Start: 2025-08-08

## 2025-08-13 DIAGNOSIS — Z79.4 TYPE 2 DIABETES MELLITUS WITH HYPERGLYCEMIA, WITH LONG-TERM CURRENT USE OF INSULIN: ICD-10-CM

## 2025-08-13 DIAGNOSIS — E11.65 TYPE 2 DIABETES MELLITUS WITH HYPERGLYCEMIA, WITH LONG-TERM CURRENT USE OF INSULIN: ICD-10-CM

## 2025-08-13 DIAGNOSIS — I10 PRIMARY HYPERTENSION: ICD-10-CM

## 2025-08-13 DIAGNOSIS — E03.9 HYPOTHYROIDISM, UNSPECIFIED TYPE: ICD-10-CM

## 2025-08-13 DIAGNOSIS — T78.40XD ALLERGY, SUBSEQUENT ENCOUNTER: ICD-10-CM

## 2025-08-13 DIAGNOSIS — N18.9 CHRONIC KIDNEY DISEASE, UNSPECIFIED CKD STAGE: ICD-10-CM

## 2025-08-13 RX ORDER — DAPAGLIFLOZIN 10 MG/1
1 TABLET, FILM COATED ORAL EVERY MORNING
Qty: 30 TABLET | Refills: 0 | Status: CANCELLED | OUTPATIENT
Start: 2025-08-13

## 2025-08-13 RX ORDER — LEVOTHYROXINE SODIUM 75 UG/1
75 TABLET ORAL EVERY MORNING
Qty: 90 TABLET | Refills: 0 | Status: CANCELLED | OUTPATIENT
Start: 2025-08-13

## 2025-08-13 RX ORDER — ORAL SEMAGLUTIDE 7 MG/1
1 TABLET ORAL DAILY
Qty: 30 TABLET | Refills: 0 | Status: SHIPPED | OUTPATIENT
Start: 2025-08-13

## 2025-08-13 RX ORDER — ORAL SEMAGLUTIDE 3 MG/1
1 TABLET ORAL EVERY MORNING
Qty: 30 TABLET | Refills: 0 | Status: CANCELLED | OUTPATIENT
Start: 2025-08-13

## 2025-08-13 RX ORDER — AMLODIPINE BESYLATE 10 MG/1
10 TABLET ORAL DAILY
Qty: 30 TABLET | Refills: 0 | Status: CANCELLED | OUTPATIENT
Start: 2025-08-13

## 2025-08-14 RX ORDER — MONTELUKAST SODIUM 10 MG/1
10 TABLET ORAL NIGHTLY
Qty: 90 TABLET | Refills: 1 | Status: SHIPPED | OUTPATIENT
Start: 2025-08-14

## (undated) DEVICE — THE SINGLE USE ETRAP – POLYP TRAP IS USED FOR SUCTION RETRIEVAL OF ENDOSCOPICALLY REMOVED POLYPS.: Brand: ETRAP

## (undated) DEVICE — SINGLE-USE BIOPSY FORCEPS: Brand: RADIAL JAW 4

## (undated) DEVICE — CONN JET HYDRA H20 AUXILIARY DISP

## (undated) DEVICE — LINER SURG CANSTR SXN S/RIGD 1500CC

## (undated) DEVICE — SNAR POLYP CAPTIFLEX XS/OVL 11X2.4MM 240CM 1P/U

## (undated) DEVICE — SOL IRRG H2O PL/BG 1000ML STRL

## (undated) DEVICE — Device: Brand: DEFENDO AIR/WATER/SUCTION AND BIOPSY VALVE

## (undated) DEVICE — Device

## (undated) DEVICE — SOLIDIFIER LIQLOC PLS 1500CC BT